# Patient Record
Sex: FEMALE | Race: WHITE | Employment: OTHER | URBAN - METROPOLITAN AREA
[De-identification: names, ages, dates, MRNs, and addresses within clinical notes are randomized per-mention and may not be internally consistent; named-entity substitution may affect disease eponyms.]

---

## 2018-05-07 ENCOUNTER — OFFICE VISIT (OUTPATIENT)
Dept: ORTHOPEDIC SURGERY | Age: 73
End: 2018-05-07

## 2018-05-07 VITALS
WEIGHT: 194 LBS | HEART RATE: 98 BPM | HEIGHT: 64 IN | SYSTOLIC BLOOD PRESSURE: 120 MMHG | BODY MASS INDEX: 33.12 KG/M2 | DIASTOLIC BLOOD PRESSURE: 83 MMHG

## 2018-05-07 DIAGNOSIS — M17.11 PRIMARY LOCALIZED OSTEOARTHROSIS OF RIGHT LOWER LEG: ICD-10-CM

## 2018-05-07 DIAGNOSIS — M25.561 ACUTE PAIN OF RIGHT KNEE: Primary | ICD-10-CM

## 2018-05-07 PROCEDURE — 99202 OFFICE O/P NEW SF 15 MIN: CPT | Performed by: ORTHOPAEDIC SURGERY

## 2018-05-07 PROCEDURE — 20610 DRAIN/INJ JOINT/BURSA W/O US: CPT | Performed by: ORTHOPAEDIC SURGERY

## 2018-05-07 RX ORDER — LIDOCAINE 50 MG/G
PATCH TOPICAL
COMMUNITY
Start: 2018-04-03

## 2018-05-07 RX ORDER — NIFEDIPINE 30 MG/1
TABLET, EXTENDED RELEASE ORAL
COMMUNITY
Start: 2018-04-30

## 2018-05-07 RX ORDER — RANITIDINE 150 MG/1
TABLET ORAL
COMMUNITY
Start: 2018-04-03

## 2018-05-07 RX ORDER — HYDROCHLOROTHIAZIDE 12.5 MG/1
CAPSULE, GELATIN COATED ORAL
COMMUNITY
Start: 2018-03-13

## 2018-05-15 ENCOUNTER — TELEPHONE (OUTPATIENT)
Dept: ORTHOPEDIC SURGERY | Age: 73
End: 2018-05-15

## 2018-05-25 ENCOUNTER — OFFICE VISIT (OUTPATIENT)
Dept: ORTHOPEDIC SURGERY | Age: 73
End: 2018-05-25

## 2018-05-25 VITALS
WEIGHT: 194 LBS | BODY MASS INDEX: 33.12 KG/M2 | SYSTOLIC BLOOD PRESSURE: 130 MMHG | HEART RATE: 80 BPM | DIASTOLIC BLOOD PRESSURE: 74 MMHG | HEIGHT: 64 IN

## 2018-05-25 DIAGNOSIS — M17.11 PRIMARY LOCALIZED OSTEOARTHROSIS OF RIGHT LOWER LEG: Primary | ICD-10-CM

## 2018-05-25 PROCEDURE — 20610 DRAIN/INJ JOINT/BURSA W/O US: CPT | Performed by: ORTHOPAEDIC SURGERY

## 2018-05-25 PROCEDURE — 99999 PR OFFICE/OUTPT VISIT,PROCEDURE ONLY: CPT | Performed by: ORTHOPAEDIC SURGERY

## 2018-06-01 ENCOUNTER — OFFICE VISIT (OUTPATIENT)
Dept: ORTHOPEDIC SURGERY | Age: 73
End: 2018-06-01

## 2018-06-01 VITALS
HEIGHT: 64 IN | HEART RATE: 78 BPM | SYSTOLIC BLOOD PRESSURE: 132 MMHG | DIASTOLIC BLOOD PRESSURE: 70 MMHG | BODY MASS INDEX: 33.12 KG/M2 | WEIGHT: 194 LBS

## 2018-06-01 DIAGNOSIS — M17.11 PRIMARY LOCALIZED OSTEOARTHROSIS OF RIGHT LOWER LEG: Primary | ICD-10-CM

## 2018-06-01 PROCEDURE — 20610 DRAIN/INJ JOINT/BURSA W/O US: CPT | Performed by: ORTHOPAEDIC SURGERY

## 2018-06-01 PROCEDURE — 99999 PR OFFICE/OUTPT VISIT,PROCEDURE ONLY: CPT | Performed by: ORTHOPAEDIC SURGERY

## 2018-06-08 ENCOUNTER — OFFICE VISIT (OUTPATIENT)
Dept: ORTHOPEDIC SURGERY | Age: 73
End: 2018-06-08

## 2018-06-08 VITALS
HEART RATE: 87 BPM | SYSTOLIC BLOOD PRESSURE: 132 MMHG | HEIGHT: 64 IN | WEIGHT: 194 LBS | DIASTOLIC BLOOD PRESSURE: 74 MMHG | BODY MASS INDEX: 33.12 KG/M2

## 2018-06-08 DIAGNOSIS — M17.11 PRIMARY LOCALIZED OSTEOARTHROSIS OF RIGHT LOWER LEG: Primary | ICD-10-CM

## 2018-06-08 PROCEDURE — 20610 DRAIN/INJ JOINT/BURSA W/O US: CPT | Performed by: ORTHOPAEDIC SURGERY

## 2018-06-08 PROCEDURE — 99999 PR OFFICE/OUTPT VISIT,PROCEDURE ONLY: CPT | Performed by: ORTHOPAEDIC SURGERY

## 2019-04-29 ENCOUNTER — OFFICE VISIT (OUTPATIENT)
Dept: ORTHOPEDIC SURGERY | Age: 74
End: 2019-04-29
Payer: COMMERCIAL

## 2019-04-29 VITALS — WEIGHT: 194 LBS | HEIGHT: 64 IN | BODY MASS INDEX: 33.12 KG/M2

## 2019-04-29 DIAGNOSIS — M17.11 PRIMARY LOCALIZED OSTEOARTHROSIS OF RIGHT LOWER LEG: Primary | ICD-10-CM

## 2019-04-29 PROCEDURE — 99213 OFFICE O/P EST LOW 20 MIN: CPT | Performed by: ORTHOPAEDIC SURGERY

## 2019-04-29 PROCEDURE — 20610 DRAIN/INJ JOINT/BURSA W/O US: CPT | Performed by: ORTHOPAEDIC SURGERY

## 2019-04-29 NOTE — PROGRESS NOTES
MD Mark Esquivel PA-C         Orthopaedic Surgery and Sports Medicine      Chief Complaint:  Knee Pain (RIGHT)      History of Present Illness:  Renae Leonard is a 74 y.o. female here regarding right knee pain. The pain is located medial.   Patient feels this discomfort may be related to a known injury No    The patient describes the symptoms as aching and sharp. Symptoms improve with rest, avoiding painful activities. The symptoms are worse with activity, weight bearing. Discomfort has been progressive over time. Sleeping habits related to chief complaint:fair    Outside reports reviewed: historical medical records. Medical History:  Patient's medications, allergies, past medical, surgical, social and family histories were reviewed and updated as appropriate. History reviewed. No pertinent past medical history. History reviewed. No pertinent surgical history. History reviewed. No pertinent family history.     Social History     Socioeconomic History    Marital status: Single     Spouse name: None    Number of children: None    Years of education: None    Highest education level: None   Occupational History    None   Social Needs    Financial resource strain: None    Food insecurity:     Worry: None     Inability: None    Transportation needs:     Medical: None     Non-medical: None   Tobacco Use    Smoking status: Never Smoker    Smokeless tobacco: Never Used   Substance and Sexual Activity    Alcohol use: None    Drug use: None    Sexual activity: None   Lifestyle    Physical activity:     Days per week: None     Minutes per session: None    Stress: None   Relationships    Social connections:     Talks on phone: None     Gets together: None     Attends Religion service: None     Active member of club or organization: None     Attends meetings of clubs or organizations: None     Relationship status: plantarflexion grossly intact. Neurologic & vascular: Sensation to both feet is grossly intact to light touch. The bilateral lower extremities are warm and well-perfused with brisk capillary refill. Additional Examinations:  Left Lower Extremity: Examination of the left lower extremity does not show any tenderness, deformity or injury. Range of motion is within normal limits. There is no gross instability. There are no rashes, ulcerations or lesions. Strength and tone are normal.      DIAGNOSTICS  Xrays obtained in office today: No  Xrays reviewed today: Yes  4 views of the right knee show   Fracture: no   Dislocation: no  Patellofemoral arthritis: moderate  Medial compartment: severe  Lateral compartment: mild  Varus deformity: No  Valgus deformity: No      ASSESSMENT (Medical Decision Making)    James Sidhu is a 74 y.o. female with the following diagnosis:  right moderate knee arthritis      ICD-10-CM    1. Primary localized osteoarthrosis of right lower leg M17.11 XR KNEE RIGHT (MIN 4 VIEWS)     79827 - UT DRAIN/INJECT LARGE JOINT/BURSA     UT METHYLPREDNISOLONE 40 MG INJ       Her overall course is unchanged despite conservative treatment      PLAN (Medical Decision Making)  Office Procedures:  Orders Placed This Encounter   Procedures    XR KNEE RIGHT (MIN 4 VIEWS)     Standing Status:   Future     Standing Expiration Date:   4/25/2020  20610 - UT DRAIN/INJECT LARGE JOINT/BURSA    UT METHYLPREDNISOLONE 40 MG INJ     The risks and benefits of an injection were discussed with the patient. The patient had full opportunity to ask questions and all were answered. The patient then provided verbal informed consent. The skin was then prepped with betadine solution and alcohol. Under aseptic conditions, the  right knee was injected with 2cc of 1% xylocaine, then a mixture of 3cc of 0.25% marcaine and 2cc (80 mg) of Depomedrol. There were no immediate complications following the injection.   The

## 2019-04-29 NOTE — LETTER
[place] [id number] [place] [address] [place] [facility] [address]  Phone: [phone]  Fax: [phone]    Valeria Frazier MD        April 29, 2019     Patient: Danny Deluca   YOB: 1945   Date of Visit: 4/29/2019       To Whom It May Concern: It is my medical opinion that Zahida Schaefer requires a disability parking placard for the following reasons:  OA RIGHT KNEE  Duration of need: 12 MONTHS    If you have any questions or concerns, please don't hesitate to call.     Sincerely,          MD Valeria Mercado MD

## 2021-08-20 ENCOUNTER — OFFICE VISIT (OUTPATIENT)
Dept: ORTHOPEDIC SURGERY | Age: 76
End: 2021-08-20
Payer: MEDICARE

## 2021-08-20 VITALS
DIASTOLIC BLOOD PRESSURE: 73 MMHG | SYSTOLIC BLOOD PRESSURE: 177 MMHG | BODY MASS INDEX: 34.55 KG/M2 | WEIGHT: 195 LBS | HEART RATE: 71 BPM | HEIGHT: 63 IN

## 2021-08-20 DIAGNOSIS — M17.11 PRIMARY OSTEOARTHRITIS OF RIGHT KNEE: Primary | ICD-10-CM

## 2021-08-20 PROCEDURE — G8417 CALC BMI ABV UP PARAM F/U: HCPCS | Performed by: ORTHOPAEDIC SURGERY

## 2021-08-20 PROCEDURE — 4040F PNEUMOC VAC/ADMIN/RCVD: CPT | Performed by: ORTHOPAEDIC SURGERY

## 2021-08-20 PROCEDURE — G8400 PT W/DXA NO RESULTS DOC: HCPCS | Performed by: ORTHOPAEDIC SURGERY

## 2021-08-20 PROCEDURE — 99203 OFFICE O/P NEW LOW 30 MIN: CPT | Performed by: ORTHOPAEDIC SURGERY

## 2021-08-20 PROCEDURE — 1036F TOBACCO NON-USER: CPT | Performed by: ORTHOPAEDIC SURGERY

## 2021-08-20 PROCEDURE — 20610 DRAIN/INJ JOINT/BURSA W/O US: CPT | Performed by: ORTHOPAEDIC SURGERY

## 2021-08-20 PROCEDURE — 1123F ACP DISCUSS/DSCN MKR DOCD: CPT | Performed by: ORTHOPAEDIC SURGERY

## 2021-08-20 PROCEDURE — G8427 DOCREV CUR MEDS BY ELIG CLIN: HCPCS | Performed by: ORTHOPAEDIC SURGERY

## 2021-08-20 PROCEDURE — 1090F PRES/ABSN URINE INCON ASSESS: CPT | Performed by: ORTHOPAEDIC SURGERY

## 2021-08-20 NOTE — PROGRESS NOTES
Transportation Needs:     Lack of Transportation (Medical):  Lack of Transportation (Non-Medical):    Physical Activity:     Days of Exercise per Week:     Minutes of Exercise per Session:    Stress:     Feeling of Stress :    Social Connections:     Frequency of Communication with Friends and Family:     Frequency of Social Gatherings with Friends and Family:     Attends [employer]ist Services:     Active Member of Clubs or Organizations:     Attends Club or Organization Meetings:     Marital Status:    Intimate Partner Violence:     Fear of Current or Ex-Partner:     Emotionally Abused:     Physically Abused:     Sexually Abused:        Family HISTORY    History reviewed. No pertinent family history. PHYSICAL EXAM    Vital Signs:  BP (!) 177/73   Pulse 71   Ht 5' 3\" (1.6 m)   Wt 195 lb (88.5 kg)   BMI 34.54 kg/m²   General Appearance:  Normal body habitus. Alert and oriented to person, place, and time. Affect:  Normal.   Gait: Antalgic using walker. Good balance and coordination. Skin:  Intact. Sensation:  Intact. Strength:  Intact. Reflexes:  Intact. Pulses:  Intact. Knee Exam:    Effusion: Negative    Range of Motion Right Left   Extension -8 0   Flexion 110 110     Provocative Test Right Left    Positive Negative Positive Negative   Anterior drawer [] [] [] []   Lachman [] [] [] []   Posterior drawer [] [] [] []   Varus testing [] [] [] []   Valgus testing [x] [] [] [x]   Joint line tenderness [x] [] [] [x]     Additional Exam Comments: Her neurocirculatory lymphatic exam is normal symmetric both lower extremities. She does have some fullness to the popliteal region consistent with a small Baker's cyst which is really not fluctuant more of a solid type synovial cyst solid mass. She has crepitus in the knee and some pain with range of motion.       IMAGING STUDIES    X-rays 2 views of her right knee demonstrate moderately severe grade 3+ osteoarthritis mostly medial compartment with calcification behind the knee consistent with her solid-appearing type mass. IMPRESSION    Right knee pain secondary to osteoarthritis    PLAN      1. Conservative care options including physical therapy, NSAIDs, bracing, and activity modification were discussed. 2.  The indications for therapeutic injections were discussed. 3.  The indications for additional imaging studies were discussed. 4.  After considering the various options discussed, the patient elected to pursue a course that includes a cortisone injection today and request viscosupplementation in the future. Recommendation is for a cortisone injection into the right knee. After informed consent was received from the patient, the right knee was injected with 1 mL(40mg)Depo-Medrol and 4 mL of 0.25% Marcaine  in the syringe from an anterolateral joint line approach, using a 25-gauge needle, under sterile Betadine prep, using ethyl chloride as a topical refrigerant, for a diagnosis of osteoarthritis. The patient appeared to tolerate it well. The patient should return here periodically as needed. Encounter Diagnosis   Name Primary?  Primary osteoarthritis of right knee Yes        No orders of the defined types were placed in this encounter.

## 2021-08-20 NOTE — LETTER
[address]  [address] [facility] [address]  Phone: [phone]  Fax: [phone]    Valdez Felix MD        2021    [address] [place] 1945  DOS 2021      KNEE VISIT      HISTORY OF PRESENT ILLNESS    Monisha Rutherford is a 64 y.o. female who presents for evaluation of right knee pain she has had for a number of years. She has done well with cortisone viscosupplementation in the past but states that she was given Durolane injection from an unusual injection site which caused her to have severe pain and swelling that is been unremitting since that injection last year. At this time she is here to see if we can help alleviate some pain and help her. I believe she had the Visco in February. ROS    Well-documented patient she form dated 2021  All other ROS negative except for above. Past Surgical history    History reviewed. No pertinent surgical history. PAST MEDICAL    History reviewed. No pertinent past medical history. Allergies    No Known Allergies    Meds    Current Outpatient Medications   Medication Sig Dispense Refill    lidocaine (LIDODERM) 5 %       NIFEdipine (PROCARDIA XL) 30 MG extended release tablet       ranitidine (ZANTAC) 150 MG tablet       hydrochlorothiazide (MICROZIDE) 12.5 MG capsule        No current facility-administered medications for this visit.        Social    Social History     Socioeconomic History    Marital status: Single     Spouse name: Not on file    Number of children: Not on file    Years of education: Not on file    Highest education level: Not on file   Occupational History    Not on file   Tobacco Use    Smoking status: Never Smoker    Smokeless tobacco: Never Used   Substance and Sexual Activity    Alcohol use: Not on file    Drug use: Not on file    Sexual activity: Not on file   Other Topics Concern    Not on file

## 2021-10-05 ENCOUNTER — OFFICE VISIT (OUTPATIENT)
Dept: ORTHOPEDIC SURGERY | Age: 76
End: 2021-10-05
Payer: MEDICARE

## 2021-10-05 VITALS — HEIGHT: 63 IN | BODY MASS INDEX: 34.55 KG/M2 | WEIGHT: 195 LBS

## 2021-10-05 DIAGNOSIS — M17.11 PRIMARY OSTEOARTHRITIS OF RIGHT KNEE: Primary | ICD-10-CM

## 2021-10-05 PROCEDURE — 20610 DRAIN/INJ JOINT/BURSA W/O US: CPT | Performed by: ORTHOPAEDIC SURGERY

## 2021-10-05 RX ORDER — METHYLPREDNISOLONE ACETATE 40 MG/ML
40 INJECTION, SUSPENSION INTRA-ARTICULAR; INTRALESIONAL; INTRAMUSCULAR; SOFT TISSUE ONCE
Status: COMPLETED | OUTPATIENT
Start: 2021-10-05 | End: 2021-10-05

## 2021-10-05 RX ORDER — BUPIVACAINE HYDROCHLORIDE 2.5 MG/ML
7 INJECTION, SOLUTION INFILTRATION; PERINEURAL ONCE
Status: COMPLETED | OUTPATIENT
Start: 2021-10-05 | End: 2021-10-05

## 2021-10-05 RX ADMIN — METHYLPREDNISOLONE ACETATE 40 MG: 40 INJECTION, SUSPENSION INTRA-ARTICULAR; INTRALESIONAL; INTRAMUSCULAR; SOFT TISSUE at 15:27

## 2021-10-05 RX ADMIN — BUPIVACAINE HYDROCHLORIDE 17.5 MG: 2.5 INJECTION, SOLUTION INFILTRATION; PERINEURAL at 15:27

## 2021-10-05 NOTE — PROGRESS NOTES
Recommendation is for viscosupplementation using Durolane. The Right knee was injected with 3 ml of Durolane from an anterolateral joint line approach using a 22-gauge needle under sterile Betadine prep, using ethyl chloride as a topical refrigerant, for a diagnosis of osteoarthritis. The patient appeared to tolerate it well. The patient should return here in 2 months. Recommendation is for a cortisone injection into the right knee. After informed consent was received from the patient, the right knee was injected with 1 mL(40mg)Depo-Medrol and 4 mL of 0.25% Marcaine  in the syringe from an anterolateral joint line approach, using a 25-gauge needle, under sterile Betadine prep, using ethyl chloride as a topical refrigerant, for a diagnosis of osteoarthritis. The patient appeared to tolerate it well. The patient should return here periodically as needed. Encounter Diagnosis   Name Primary?     Primary osteoarthritis of right knee Yes        Orders Placed This Encounter   Procedures    IL ARTHROCENTESIS ASPIR&/INJ MAJOR JT/BURSA W/O US

## 2021-10-15 ENCOUNTER — CLINICAL DOCUMENTATION (OUTPATIENT)
Dept: OTHER | Age: 76
End: 2021-10-15

## 2022-01-20 ENCOUNTER — TELEPHONE (OUTPATIENT)
Dept: ORTHOPEDIC SURGERY | Age: 77
End: 2022-01-20

## 2022-01-20 NOTE — TELEPHONE ENCOUNTER
Spoke with patient in regards to the [address] [place] [address] knee joint pain program. Patient was established with Dr. Manjula Marrero but stated that the injections she received in the past didn't help her. She doesn't want to come back at this time. She can give us a call at [phone] if there is anything further that we can do for her in the future.

## 2024-12-23 ENCOUNTER — TELEPHONE (OUTPATIENT)
Dept: INTERVENTIONAL RADIOLOGY/VASCULAR | Age: 79
End: 2024-12-23

## 2024-12-23 NOTE — TELEPHONE ENCOUNTER
Call placed to [facility], but unable to connect with imaging department. Images needed to approve Abd Lymph node bx. Zachariah [facility] made aware.

## 2024-12-24 ENCOUNTER — HOSPITAL ENCOUNTER (OUTPATIENT)
Dept: PET IMAGING | Age: 79
Discharge: HOME | End: 2024-12-24
Payer: MEDICARE

## 2024-12-24 DIAGNOSIS — C78.6 SECONDARY MALIGNANT NEOPLASM OF RETROPERITONEUM AND PERITONEUM (HCC): ICD-10-CM

## 2024-12-24 PROCEDURE — A9609 HC RX DIAGNOSTIC RADIOPHARMACEUTICAL: HCPCS | Performed by: INTERNAL MEDICINE

## 2024-12-24 PROCEDURE — 78815 PET IMAGE W/CT SKULL-THIGH: CPT

## 2024-12-24 PROCEDURE — 3430000000 HC RX DIAGNOSTIC RADIOPHARMACEUTICAL: Performed by: INTERNAL MEDICINE

## 2024-12-24 RX ORDER — FLUDEOXYGLUCOSE F 18 200 MCI/ML
14.27 INJECTION, SOLUTION INTRAVENOUS
Status: COMPLETED | OUTPATIENT
Start: 2024-12-24 | End: 2024-12-24

## 2024-12-24 RX ADMIN — FLUDEOXYGLUCOSE F 18 14.27 MILLICURIE: 200 INJECTION, SOLUTION INTRAVENOUS at 13:52

## 2024-12-26 ENCOUNTER — TELEPHONE (OUTPATIENT)
Dept: INTERVENTIONAL RADIOLOGY/VASCULAR | Age: 79
End: 2024-12-26

## 2024-12-26 NOTE — TELEPHONE ENCOUNTER
Call placed to [facility] to have CT images pushed. Spoke with jeremy in Radiology who stated she would try to push images again.

## 2024-12-27 ENCOUNTER — TELEPHONE (OUTPATIENT)
Dept: INTERVENTIONAL RADIOLOGY/VASCULAR | Age: 79
End: 2024-12-27

## 2024-12-27 NOTE — TELEPHONE ENCOUNTER
Called and spoke to Patient about upcoming procedure. Phone number used: [phone]  Procedure: Lymph node biopsy  Approving Radiologist: Clau     Pt informed of the following:  Date of procedure: 01/06/2025   Arrival time of procedure: 0830  Time of procedure: 0930  Check in at Main Entrance prior to procedure.    Pre Procedure Checklist:   H & P completed within 30 days of scheduled procedure?Yes  If No  Call placed to PCP no    Allergies:  Reviewed?Yes  Have you ever had a reaction to Contrast/ IV Dye? No  If Yes, What was reaction?   Lidocaine Allergy?  No  If Yes, What was reaction?     Medications:  On any blood thinners? No.   If yes: N/A  Instructed to hold: N/A     Blood pressure medication?  Yes. If yes, take the morning of procedure.     Sedation:  Sedation Type:IV Sedation    Any issues with sedation in the past? No  If Yes, What was reaction? na  Patient will need a  the day of procedure.     Nothing to eat or drink after midnight.        Need SDS: No    Orders:  Pre-procedure orders placed.Yes   Lab ordered: CBC, PT/INR    Post-procedure recovery will be here 2 hours.

## 2025-01-06 ENCOUNTER — TELEPHONE (OUTPATIENT)
Dept: INTERVENTIONAL RADIOLOGY/VASCULAR | Age: 80
End: 2025-01-06

## 2025-01-06 NOTE — TELEPHONE ENCOUNTER
Pt called to reschedule to 1/13/25 due to weather. Pt will arrive at 0830, and be ready/recovered by IR team.

## 2025-01-13 ENCOUNTER — HOSPITAL ENCOUNTER (OUTPATIENT)
Dept: CT IMAGING | Age: 80
Discharge: HOME | End: 2025-01-13
Payer: MEDICARE

## 2025-01-13 VITALS
HEIGHT: 62 IN | WEIGHT: 162 LBS | RESPIRATION RATE: 16 BRPM | BODY MASS INDEX: 29.81 KG/M2 | DIASTOLIC BLOOD PRESSURE: 67 MMHG | TEMPERATURE: 98.2 F | HEART RATE: 81 BPM | OXYGEN SATURATION: 98 % | SYSTOLIC BLOOD PRESSURE: 116 MMHG

## 2025-01-13 DIAGNOSIS — C78.6 PERITONEAL CARCINOMATOSIS (HCC): ICD-10-CM

## 2025-01-13 LAB
DEPRECATED RDW RBC AUTO: 14.8 % (ref 12.4–15.4)
HCT VFR BLD AUTO: 38.4 % (ref 36–48)
HGB BLD-MCNC: 12.7 G/DL (ref 12–16)
INR PPP: 1.05 (ref 0.85–1.15)
MCH RBC QN AUTO: 29.5 PG (ref 26–34)
MCHC RBC AUTO-ENTMCNC: 33.2 G/DL (ref 31–36)
MCV RBC AUTO: 88.9 FL (ref 80–100)
PLATELET # BLD AUTO: 328 K/UL (ref 135–450)
PMV BLD AUTO: 7.4 FL (ref 5–10.5)
PROTHROMBIN TIME: 14 SEC (ref 11.9–14.9)
RBC # BLD AUTO: 4.32 M/UL (ref 4–5.2)
WBC # BLD AUTO: 13 K/UL (ref 4–11)

## 2025-01-13 PROCEDURE — 38505 NEEDLE BIOPSY LYMPH NODES: CPT | Performed by: INTERNAL MEDICINE

## 2025-01-13 PROCEDURE — 77012 CT SCAN FOR NEEDLE BIOPSY: CPT

## 2025-01-13 PROCEDURE — 88333 PATH CONSLTJ SURG CYTO XM 1: CPT

## 2025-01-13 PROCEDURE — 88305 TISSUE EXAM BY PATHOLOGIST: CPT

## 2025-01-13 PROCEDURE — 88342 IMHCHEM/IMCYTCHM 1ST ANTB: CPT

## 2025-01-13 PROCEDURE — 36415 COLL VENOUS BLD VENIPUNCTURE: CPT

## 2025-01-13 PROCEDURE — 88360 TUMOR IMMUNOHISTOCHEM/MANUAL: CPT

## 2025-01-13 PROCEDURE — 88341 IMHCHEM/IMCYTCHM EA ADD ANTB: CPT

## 2025-01-13 PROCEDURE — 85610 PROTHROMBIN TIME: CPT

## 2025-01-13 PROCEDURE — 88313 SPECIAL STAINS GROUP 2: CPT

## 2025-01-13 PROCEDURE — 6360000002 HC RX W HCPCS: Performed by: RADIOLOGY

## 2025-01-13 PROCEDURE — 99152 MOD SED SAME PHYS/QHP 5/>YRS: CPT

## 2025-01-13 PROCEDURE — 85027 COMPLETE CBC AUTOMATED: CPT

## 2025-01-13 RX ORDER — MIDAZOLAM HYDROCHLORIDE 5 MG/ML
INJECTION, SOLUTION INTRAMUSCULAR; INTRAVENOUS PRN
Status: COMPLETED | OUTPATIENT
Start: 2025-01-13 | End: 2025-01-13

## 2025-01-13 RX ORDER — SODIUM CHLORIDE 0.9 % (FLUSH) 0.9 %
5-40 SYRINGE (ML) INJECTION PRN
Status: DISCONTINUED | OUTPATIENT
Start: 2025-01-13 | End: 2025-01-14 | Stop reason: HOSPADM

## 2025-01-13 RX ORDER — FENTANYL CITRATE 50 UG/ML
INJECTION, SOLUTION INTRAMUSCULAR; INTRAVENOUS PRN
Status: COMPLETED | OUTPATIENT
Start: 2025-01-13 | End: 2025-01-13

## 2025-01-13 RX ORDER — SODIUM CHLORIDE 9 MG/ML
INJECTION, SOLUTION INTRAVENOUS PRN
Status: DISCONTINUED | OUTPATIENT
Start: 2025-01-13 | End: 2025-01-14 | Stop reason: HOSPADM

## 2025-01-13 RX ORDER — SODIUM CHLORIDE 0.9 % (FLUSH) 0.9 %
5-40 SYRINGE (ML) INJECTION EVERY 12 HOURS SCHEDULED
Status: DISCONTINUED | OUTPATIENT
Start: 2025-01-13 | End: 2025-01-14 | Stop reason: HOSPADM

## 2025-01-13 RX ADMIN — FENTANYL CITRATE 50 MCG: 50 INJECTION INTRAMUSCULAR; INTRAVENOUS at 11:39

## 2025-01-13 RX ADMIN — MIDAZOLAM HYDROCHLORIDE 0.5 MG: 5 INJECTION, SOLUTION INTRAMUSCULAR; INTRAVENOUS at 11:48

## 2025-01-13 RX ADMIN — MIDAZOLAM HYDROCHLORIDE 1 MG: 5 INJECTION, SOLUTION INTRAMUSCULAR; INTRAVENOUS at 11:40

## 2025-01-13 RX ADMIN — FENTANYL CITRATE 25 MCG: 50 INJECTION INTRAMUSCULAR; INTRAVENOUS at 11:47

## 2025-01-13 ASSESSMENT — PAIN - FUNCTIONAL ASSESSMENT
PAIN_FUNCTIONAL_ASSESSMENT: NONE - DENIES PAIN
PAIN_FUNCTIONAL_ASSESSMENT: NONE - DENIES PAIN

## 2025-01-13 NOTE — PROGRESS NOTES
Pt to recover x 1 hour per Dr. Moulton. Vitals stable, on room air. Pt's  - Edil at bedside and updated.

## 2025-01-13 NOTE — PRE SEDATION
Sedation Pre-Procedure Note    Patient Name: Anita Zamora   YOB: 1945  Room/Bed: Room/bed info not found  Medical Record Number: [id number]  Date: 1/13/2025   Time: 11:31 AM       Indication:  Abdominal Mass Biopsy    Consent: I have discussed with the patient and/or the patient representative the indication, alternatives, and the possible risks and/or complications of the planned procedure and the anesthesia methods. The patient and/or patient representative appear to understand and agree to proceed.    Vital Signs:   Vitals:    01/13/25 0958   BP: (!) 121/55   Pulse: 81   Resp: 16   Temp: 97.5 °F (36.4 °C)   SpO2: 96%       Past Medical History:   has a past medical history of Acid reflux, Back pain, and Hypertension.    Past Surgical History:   has no past surgical history on file.    Medications:   Scheduled Meds:    sodium chloride flush  5-40 mL IntraVENous 2 times per day     Continuous Infusions:    sodium chloride       PRN Meds: sodium chloride flush, sodium chloride  Home Meds:   Prior to Admission medications    Medication Sig Start Date End Date Taking? Authorizing Provider   NIFEdipine (PROCARDIA XL) 30 MG extended release tablet  4/30/18  Yes ProviderLuca MD   lidocaine (LIDODERM) 5 %  4/3/18   Provider, MD Luca     Coumadin Use Last 7 Days:  no  Antiplatelet drug therapy use last 7 days: no  Other anticoagulant use last 7 days: no  Additional Medication Information:        Pre-Sedation Documentation and Exam:   I have reviewed the patient's history and review of systems.    Mallampati Airway Assessment:  Mallampati Class II - (soft palate, fauces & uvula are visible)    Prior History of Anesthesia Complications:   none    ASA Classification:  Class 2 - A normal healthy patient with mild systemic disease    Sedation/ Anesthesia Plan:   intravenous sedation    Medications Planned:   midazolam (Versed) intravenously and fentanyl intravenously    Patient is an

## 2025-01-13 NOTE — OR NURSING
Image guided lymph node biopsy biopsy completed by Dr. Moulton. Pt tolerated procedure without any signs or symptoms of distress. Vital signs stable. Report given  to IR TEAM RN. Pt transported back to [facility] in stable condition via stretcher.     Total Biopsy: 3  Received: Versed: 1.5 mg       Fentanyl: 75 mcg  Bandage to MID ABD that is clean dry and intact.     This RN wasted the following with SANDRA MARIANO.  0.5 mg Versed  25 mcg Fentanyl     Vital Signs  Vitals:    01/13/25 1156   BP: (!) 112/56   Pulse: 71   Resp: 15   Temp:    SpO2: 99%    (vital signs in table format)    Post Aleksandra  2 - Able to move 4 extremities voluntarily on command  2 - BP+/- 20mmHg of normal  2 - Fully awake  2 - Able to maintain oxygen saturation >92% on room air  2 - Able to breathe deeply and cough freely

## 2025-01-13 NOTE — OR NURSING
Pt arrived for image guided lymph node biopsy mid abd. Procedure explained including the risk and benefits of the procedure. All questions answered. Pt verbalizes understanding of the of procedure and states no more questions. Consent signed. Vital signs stable, labs, allergies, medications, and code status reviewed. No contraindications noted.     Vitals:    01/13/25 1136   BP: 131/67   Pulse: 76   Resp: 17   Temp:    SpO2: 96%    (vital signs in table format)    Aleksandra Score  2 - Able to move 4 extremities voluntarily on command  2 - BP+/- 20mmHg of normal  2 - Fully awake  2 - Able to maintain oxygen saturation >92% on room air  2 - Able to breathe deeply and cough freely    Allergies  Patient has no known allergies.    Labs  Lab Results   Component Value Date    INR 1.05 01/13/2025    PROTIME 14.0 01/13/2025       No results found for: \"CREATININE\", \"BUN\", \"GFR\", \"NA\", \"K\", \"CL\", \"CO2\"    Lab Results   Component Value Date    WBC 13.0 (H) 01/13/2025    HGB 12.7 01/13/2025    HCT 38.4 01/13/2025    MCV 88.9 01/13/2025     01/13/2025

## 2025-02-24 ENCOUNTER — APPOINTMENT (OUTPATIENT)
Dept: GENERAL RADIOLOGY | Age: 80
End: 2025-02-24
Payer: MEDICARE

## 2025-02-24 ENCOUNTER — HOSPITAL ENCOUNTER (INPATIENT)
Age: 80
LOS: 4 days | Discharge: NURSING, REHAB OR LONG-TERM CARE | End: 2025-02-28
Attending: EMERGENCY MEDICINE | Admitting: INTERNAL MEDICINE
Payer: MEDICARE

## 2025-02-24 DIAGNOSIS — E86.0 DEHYDRATION: ICD-10-CM

## 2025-02-24 DIAGNOSIS — I83.892 VARICOSE VEINS OF LEFT LOWER EXTREMITY WITH EDEMA: ICD-10-CM

## 2025-02-24 DIAGNOSIS — R53.1 GENERAL WEAKNESS: ICD-10-CM

## 2025-02-24 DIAGNOSIS — R60.0 LEG EDEMA, LEFT: ICD-10-CM

## 2025-02-24 DIAGNOSIS — C48.2 MALIGNANT NEOPLASM OF PERITONEUM, UNSPECIFIED (HCC): ICD-10-CM

## 2025-02-24 DIAGNOSIS — R13.19 ESOPHAGEAL DYSPHAGIA: ICD-10-CM

## 2025-02-24 DIAGNOSIS — R52 PAIN: ICD-10-CM

## 2025-02-24 DIAGNOSIS — D72.819 LEUKOPENIA, UNSPECIFIED TYPE: Primary | ICD-10-CM

## 2025-02-24 DIAGNOSIS — T45.1X5A ADVERSE EFFECT OF CHEMOTHERAPY, INITIAL ENCOUNTER: ICD-10-CM

## 2025-02-24 DIAGNOSIS — R19.7 DIARRHEA, UNSPECIFIED TYPE: ICD-10-CM

## 2025-02-24 LAB
ALBUMIN SERPL-MCNC: 2.8 G/DL (ref 3.4–5)
ALBUMIN/GLOB SERPL: 1 {RATIO} (ref 1.1–2.2)
ALP SERPL-CCNC: 73 U/L (ref 40–129)
ALT SERPL-CCNC: 16 U/L (ref 10–40)
ANION GAP SERPL CALCULATED.3IONS-SCNC: 17 MMOL/L (ref 3–16)
ANISOCYTOSIS BLD QL SMEAR: ABNORMAL
AST SERPL-CCNC: 26 U/L (ref 15–37)
BASOPHILS # BLD: 0 K/UL (ref 0–0.2)
BASOPHILS NFR BLD: 0 %
BILIRUB SERPL-MCNC: 0.5 MG/DL (ref 0–1)
BUN SERPL-MCNC: 46 MG/DL (ref 7–20)
BURR CELLS BLD QL SMEAR: ABNORMAL
C DIFF TOX A+B STL QL IA: NORMAL
CALCIUM SERPL-MCNC: 7.6 MG/DL (ref 8.3–10.6)
CHLORIDE SERPL-SCNC: 102 MMOL/L (ref 99–110)
CO2 SERPL-SCNC: 19 MMOL/L (ref 21–32)
CREAT SERPL-MCNC: 1.2 MG/DL (ref 0.6–1.2)
DACRYOCYTES BLD QL SMEAR: ABNORMAL
DEPRECATED RDW RBC AUTO: 15.2 % (ref 12.4–15.4)
EKG ATRIAL RATE: 102 BPM
EKG DIAGNOSIS: NORMAL
EKG P AXIS: 28 DEGREES
EKG P-R INTERVAL: 156 MS
EKG Q-T INTERVAL: 366 MS
EKG QRS DURATION: 130 MS
EKG QTC CALCULATION (BAZETT): 477 MS
EKG R AXIS: 0 DEGREES
EKG T AXIS: 175 DEGREES
EKG VENTRICULAR RATE: 102 BPM
EOSINOPHIL # BLD: 0 K/UL (ref 0–0.6)
EOSINOPHIL NFR BLD: 0 %
FLUAV RNA RESP QL NAA+PROBE: NOT DETECTED
FLUBV RNA RESP QL NAA+PROBE: NOT DETECTED
GFR SERPLBLD CREATININE-BSD FMLA CKD-EPI: 45 ML/MIN/{1.73_M2}
GLUCOSE SERPL-MCNC: 195 MG/DL (ref 70–99)
HCT VFR BLD AUTO: 30.1 % (ref 36–48)
HGB BLD-MCNC: 10.2 G/DL (ref 12–16)
HYPOCHROMIA BLD QL SMEAR: ABNORMAL
LACTATE BLDV-SCNC: 4.2 MMOL/L (ref 0.4–2)
LACTATE BLDV-SCNC: 4.6 MMOL/L (ref 0.4–2)
LYMPHOCYTES # BLD: 0.8 K/UL (ref 1–5.1)
LYMPHOCYTES NFR BLD: 40 %
MCH RBC QN AUTO: 30 PG (ref 26–34)
MCHC RBC AUTO-ENTMCNC: 34 G/DL (ref 31–36)
MCV RBC AUTO: 88.1 FL (ref 80–100)
MONOCYTES # BLD: 0.1 K/UL (ref 0–1.3)
MONOCYTES NFR BLD: 6 %
NEUTROPHILS # BLD: 1 K/UL (ref 1.7–7.7)
NEUTROPHILS NFR BLD: 51 %
NEUTS BAND NFR BLD MANUAL: 3 % (ref 0–7)
OVALOCYTES BLD QL SMEAR: ABNORMAL
PATH INTERP BLD-IMP: YES
PLATELET # BLD AUTO: 220 K/UL (ref 135–450)
PLATELET BLD QL SMEAR: ADEQUATE
PMV BLD AUTO: 8.5 FL (ref 5–10.5)
POIKILOCYTOSIS BLD QL SMEAR: ABNORMAL
POTASSIUM SERPL-SCNC: 3.6 MMOL/L (ref 3.5–5.1)
PROT SERPL-MCNC: 5.6 G/DL (ref 6.4–8.2)
RBC # BLD AUTO: 3.41 M/UL (ref 4–5.2)
SARS-COV-2 RNA RESP QL NAA+PROBE: NOT DETECTED
SLIDE REVIEW: ABNORMAL
SMUDGE CELLS BLD QL SMEAR: PRESENT
SODIUM SERPL-SCNC: 138 MMOL/L (ref 136–145)
WBC # BLD AUTO: 1.9 K/UL (ref 4–11)

## 2025-02-24 PROCEDURE — 36415 COLL VENOUS BLD VENIPUNCTURE: CPT

## 2025-02-24 PROCEDURE — 87040 BLOOD CULTURE FOR BACTERIA: CPT

## 2025-02-24 PROCEDURE — 96360 HYDRATION IV INFUSION INIT: CPT

## 2025-02-24 PROCEDURE — 80053 COMPREHEN METABOLIC PANEL: CPT

## 2025-02-24 PROCEDURE — 96365 THER/PROPH/DIAG IV INF INIT: CPT

## 2025-02-24 PROCEDURE — 87324 CLOSTRIDIUM AG IA: CPT

## 2025-02-24 PROCEDURE — 2580000003 HC RX 258: Performed by: EMERGENCY MEDICINE

## 2025-02-24 PROCEDURE — 87449 NOS EACH ORGANISM AG IA: CPT

## 2025-02-24 PROCEDURE — 85025 COMPLETE CBC W/AUTO DIFF WBC: CPT

## 2025-02-24 PROCEDURE — 2580000003 HC RX 258

## 2025-02-24 PROCEDURE — 93010 ELECTROCARDIOGRAM REPORT: CPT | Performed by: INTERNAL MEDICINE

## 2025-02-24 PROCEDURE — 6360000002 HC RX W HCPCS: Performed by: EMERGENCY MEDICINE

## 2025-02-24 PROCEDURE — 83605 ASSAY OF LACTIC ACID: CPT

## 2025-02-24 PROCEDURE — 99285 EMERGENCY DEPT VISIT HI MDM: CPT

## 2025-02-24 PROCEDURE — 71045 X-RAY EXAM CHEST 1 VIEW: CPT

## 2025-02-24 PROCEDURE — 2060000000 HC ICU INTERMEDIATE R&B

## 2025-02-24 PROCEDURE — 87636 SARSCOV2 & INF A&B AMP PRB: CPT

## 2025-02-24 PROCEDURE — 93005 ELECTROCARDIOGRAM TRACING: CPT | Performed by: EMERGENCY MEDICINE

## 2025-02-24 RX ORDER — SODIUM CHLORIDE, SODIUM LACTATE, POTASSIUM CHLORIDE, CALCIUM CHLORIDE 600; 310; 30; 20 MG/100ML; MG/100ML; MG/100ML; MG/100ML
INJECTION, SOLUTION INTRAVENOUS CONTINUOUS
Status: DISCONTINUED | OUTPATIENT
Start: 2025-02-24 | End: 2025-02-25

## 2025-02-24 RX ORDER — MAGNESIUM SULFATE IN WATER 40 MG/ML
2000 INJECTION, SOLUTION INTRAVENOUS PRN
Status: DISCONTINUED | OUTPATIENT
Start: 2025-02-24 | End: 2025-02-28 | Stop reason: HOSPADM

## 2025-02-24 RX ORDER — POTASSIUM CHLORIDE 1500 MG/1
40 TABLET, EXTENDED RELEASE ORAL PRN
Status: DISCONTINUED | OUTPATIENT
Start: 2025-02-24 | End: 2025-02-28 | Stop reason: HOSPADM

## 2025-02-24 RX ORDER — ACETAMINOPHEN 650 MG/1
650 SUPPOSITORY RECTAL EVERY 6 HOURS PRN
Status: DISCONTINUED | OUTPATIENT
Start: 2025-02-24 | End: 2025-02-25

## 2025-02-24 RX ORDER — ENOXAPARIN SODIUM 100 MG/ML
40 INJECTION SUBCUTANEOUS DAILY
Status: DISCONTINUED | OUTPATIENT
Start: 2025-02-25 | End: 2025-02-25

## 2025-02-24 RX ORDER — SODIUM CHLORIDE 0.9 % (FLUSH) 0.9 %
5-40 SYRINGE (ML) INJECTION EVERY 12 HOURS SCHEDULED
Status: DISCONTINUED | OUTPATIENT
Start: 2025-02-24 | End: 2025-02-28 | Stop reason: HOSPADM

## 2025-02-24 RX ORDER — ONDANSETRON 2 MG/ML
4 INJECTION INTRAMUSCULAR; INTRAVENOUS EVERY 6 HOURS PRN
Status: DISCONTINUED | OUTPATIENT
Start: 2025-02-24 | End: 2025-02-28 | Stop reason: HOSPADM

## 2025-02-24 RX ORDER — FAMOTIDINE 10 MG/ML
20 INJECTION, SOLUTION INTRAVENOUS
Status: ON HOLD | COMMUNITY
Start: 2025-02-21 | End: 2025-02-28 | Stop reason: HOSPADM

## 2025-02-24 RX ORDER — POLYETHYLENE GLYCOL 3350 17 G/17G
17 POWDER, FOR SOLUTION ORAL DAILY PRN
Status: DISCONTINUED | OUTPATIENT
Start: 2025-02-24 | End: 2025-02-28 | Stop reason: HOSPADM

## 2025-02-24 RX ORDER — SODIUM CHLORIDE 9 MG/ML
INJECTION, SOLUTION INTRAVENOUS CONTINUOUS
Status: DISCONTINUED | OUTPATIENT
Start: 2025-02-24 | End: 2025-02-25

## 2025-02-24 RX ORDER — ACETAMINOPHEN 325 MG/1
650 TABLET ORAL EVERY 6 HOURS PRN
Status: DISCONTINUED | OUTPATIENT
Start: 2025-02-24 | End: 2025-02-25

## 2025-02-24 RX ORDER — ONDANSETRON 4 MG/1
4 TABLET, ORALLY DISINTEGRATING ORAL EVERY 8 HOURS PRN
Status: DISCONTINUED | OUTPATIENT
Start: 2025-02-24 | End: 2025-02-28 | Stop reason: HOSPADM

## 2025-02-24 RX ORDER — 0.9 % SODIUM CHLORIDE 0.9 %
1000 INTRAVENOUS SOLUTION INTRAVENOUS ONCE
Status: COMPLETED | OUTPATIENT
Start: 2025-02-24 | End: 2025-02-24

## 2025-02-24 RX ORDER — SODIUM CHLORIDE 9 MG/ML
INJECTION, SOLUTION INTRAVENOUS PRN
Status: DISCONTINUED | OUTPATIENT
Start: 2025-02-24 | End: 2025-02-28 | Stop reason: HOSPADM

## 2025-02-24 RX ORDER — POTASSIUM CHLORIDE 7.45 MG/ML
10 INJECTION INTRAVENOUS PRN
Status: DISCONTINUED | OUTPATIENT
Start: 2025-02-24 | End: 2025-02-28 | Stop reason: HOSPADM

## 2025-02-24 RX ORDER — 0.9 % SODIUM CHLORIDE 0.9 %
30 INTRAVENOUS SOLUTION INTRAVENOUS ONCE
Status: COMPLETED | OUTPATIENT
Start: 2025-02-24 | End: 2025-02-24

## 2025-02-24 RX ORDER — SODIUM CHLORIDE 0.9 % (FLUSH) 0.9 %
5-40 SYRINGE (ML) INJECTION PRN
Status: DISCONTINUED | OUTPATIENT
Start: 2025-02-24 | End: 2025-02-28 | Stop reason: HOSPADM

## 2025-02-24 RX ADMIN — SODIUM CHLORIDE 2000 ML: 9 INJECTION, SOLUTION INTRAVENOUS at 12:39

## 2025-02-24 RX ADMIN — SODIUM CHLORIDE 1000 ML: 0.9 INJECTION, SOLUTION INTRAVENOUS at 16:16

## 2025-02-24 RX ADMIN — SODIUM CHLORIDE 1000 MG: 900 INJECTION INTRAVENOUS at 15:29

## 2025-02-24 RX ADMIN — SODIUM CHLORIDE: 0.9 INJECTION, SOLUTION INTRAVENOUS at 17:47

## 2025-02-24 ASSESSMENT — ENCOUNTER SYMPTOMS: DIARRHEA: 1

## 2025-02-24 NOTE — ED NOTES
I placed the patient on a bed pan because she felt like she needed to have a BM. The patient was unable to go at this time. The patient was changed and placed on a purewick. The patient is aware that we need stool and urine from her.    VM received; pt mother, Reyna Gardner requesting assistance w/scheduling apt (dc 05/24)    Dr Celeste Nuñez, ORTHOPEDIC  [facility] [phone]  Pt mother advised that she has already made her apt  Closing encounter

## 2025-02-24 NOTE — PLAN OF CARE
PCU admit     Peritoneal carcinoma   Last chemo 3 days ago- receives q3 weeks; has had 2 rounds  Neutropenic isolation   Tachycardic with soft BP  Elevated lactic- fluid bolus in ED; IVF continued  Extreme fatigue past 2 days  Chronic diarrhea- had OP GI apt; daughter wants done here while admitted. States patient has also had some dysphagia.     Admitted for weakness and inability to carry out ADLs  Eboni Pillai PA-C 2/24/2025 4:10 PM

## 2025-02-24 NOTE — ED NOTES
Daughter at the bedside would like to see the provider before she leaves for the night. I perfect served the inpatient provider.

## 2025-02-24 NOTE — ED PROVIDER NOTES
46 (*)     Est, Glom Filt Rate 45 (*)     Calcium 7.6 (*)     Total Protein 5.6 (*)     Albumin 2.8 (*)     Albumin/Globulin Ratio 1.0 (*)     All other components within normal limits   LACTIC ACID - Abnormal; Notable for the following components:    Lactic Acid 4.2 (*)     All other components within normal limits    Narrative:     CALL  [place]  [facility] tel. [phone],  Chemistry results called to and read back by jacob carmona RN, 02/24/2025  13:04, by KAYLAH   COVID-19 & INFLUENZA COMBO   C DIFF TOXIN/ANTIGEN   CULTURE, BLOOD 1   CULTURE, BLOOD 2   URINALYSIS WITH REFLEX TO CULTURE   LACTIC ACID       All otherlabs were within normal range or not returned as of this dictation.      EMERGENCY DEPARTMENT COURSE and DIFFERENTIAL DIAGNOSIS/MDM:   Vitals:    Vitals:    02/24/25 1142 02/24/25 1200 02/24/25 1310 02/24/25 1510   BP:  (!) 73/50 92/70 99/66   Pulse:   99 (!) 101   Resp:   28 28   Temp: (!) 96.5 °F (35.8 °C)      TempSrc: Temporal      SpO2:  (!) 89% 96% 95%   Weight:       Height:           Patient was given the following medications:  Medications   cefTRIAXone (ROCEPHIN) 1,000 mg in sodium chloride 0.9 % 50 mL IVPB (mini-bag) (1,000 mg IntraVENous New Bag 2/24/25 1529)   sodium chloride 0.9 % bolus 2,082 mL (0 mLs IntraVENous Stopped 2/24/25 1339)       CONSULTS: (Who and What was discussed)  IP CONSULT TO HOSPITALIST    CC/HPI Summary, DDx, ED Course, Reassessment, Disposition Considerations (include Tests not done, Admit vs D/C, Shared Decision Making, Pt Expectation of Test or Tx.):  Patient appears chronically ill on arrival with borderline hypotensive blood pressure and mild tachycardia.  Patient is given IV fluids with improvement in vital signs including resolution of hypotension.  Laboratory valuation shows leukopenia as well as elevated lactic acid.  Patient family deny any specific localizing infectious symptoms other than diarrhea denying any cough, runny nose, abdominal pain.  Rapid flu Tere and  COVID test is negative.  Chest x-rays performed the emergency department and read by radiology as was independently reviewed by myself and does show low volumes but no obvious evidence of pneumonia.  Urinalysis and stool studies ordered and patient given IV fluids however patient is unable to provide a urine sample and feel invasive catheterization and thus immunocompromise patient has a greater risk than benefit at this time.  Will obtain  blood cultures, obtain as soon as possible, and treat patient with IV fluids and empiric ceftriaxone.  Patient and family expressed understanding and agreement this plan the patient is admitted for further workup and care.    FINAL IMPRESSION      1. Leukopenia, unspecified type    2. Adverse effect of chemotherapy, initial encounter    3. Dehydration    4. General weakness          DISPOSITION/PLAN     DISPOSITION Decision To Admit 02/24/2025 03:11:18 PM   DISPOSITION CONDITION Stable           (Please note that portions of this note were completed with a voice recognition program.  Efforts were made to edit the dictations but occasionally words are mis-transcribed.)    Hadley Ames MD (electronically signed)  Attending Emergency Physician           Hadley Ames MD  02/24/25 7415

## 2025-02-24 NOTE — ED NOTES
I sent a stool sample to the lab at this time. The patient was changed and placed in a new brief and purewick.

## 2025-02-25 ENCOUNTER — APPOINTMENT (OUTPATIENT)
Dept: CT IMAGING | Age: 80
End: 2025-02-25
Payer: MEDICARE

## 2025-02-25 ENCOUNTER — APPOINTMENT (OUTPATIENT)
Age: 80
End: 2025-02-25
Payer: MEDICARE

## 2025-02-25 PROBLEM — E87.6 HYPOKALEMIA: Status: ACTIVE | Noted: 2025-02-25

## 2025-02-25 PROBLEM — E86.0 DEHYDRATION: Status: ACTIVE | Noted: 2025-02-25

## 2025-02-25 PROBLEM — R60.0 LEG EDEMA, LEFT: Status: ACTIVE | Noted: 2025-02-25

## 2025-02-25 PROBLEM — N17.9 AKI (ACUTE KIDNEY INJURY): Status: ACTIVE | Noted: 2025-02-25

## 2025-02-25 PROBLEM — R13.10 DYSPHAGIA: Status: ACTIVE | Noted: 2025-02-25

## 2025-02-25 PROBLEM — R53.1 GENERAL WEAKNESS: Status: ACTIVE | Noted: 2025-02-25

## 2025-02-25 PROBLEM — E83.42 HYPOMAGNESEMIA: Status: ACTIVE | Noted: 2025-02-25

## 2025-02-25 PROBLEM — R19.7 DIARRHEA: Status: ACTIVE | Noted: 2025-02-25

## 2025-02-25 LAB
ACANTHOCYTES BLD QL SMEAR: ABNORMAL
ANION GAP SERPL CALCULATED.3IONS-SCNC: 18 MMOL/L (ref 3–16)
ANISOCYTOSIS BLD QL SMEAR: ABNORMAL
ANTI-XA UNFRAC HEPARIN: 0.25 IU/ML (ref 0.3–0.7)
ANTI-XA UNFRAC HEPARIN: 0.82 IU/ML (ref 0.3–0.7)
APTT BLD: 33.4 SEC (ref 22.1–36.4)
BASOPHILS # BLD: 0 K/UL (ref 0–0.2)
BASOPHILS NFR BLD: 0.3 %
BUN SERPL-MCNC: 54 MG/DL (ref 7–20)
BURR CELLS BLD QL SMEAR: ABNORMAL
CALCIUM SERPL-MCNC: 6.9 MG/DL (ref 8.3–10.6)
CHLORIDE SERPL-SCNC: 108 MMOL/L (ref 99–110)
CO2 SERPL-SCNC: 15 MMOL/L (ref 21–32)
CREAT SERPL-MCNC: 2.1 MG/DL (ref 0.6–1.2)
DACRYOCYTES BLD QL SMEAR: ABNORMAL
DEPRECATED RDW RBC AUTO: 15.5 % (ref 12.4–15.4)
DEPRECATED RDW RBC AUTO: 15.7 % (ref 12.4–15.4)
ECHO BSA: 1.79 M2
EOSINOPHIL # BLD: 0 K/UL (ref 0–0.6)
EOSINOPHIL NFR BLD: 0.2 %
GFR SERPLBLD CREATININE-BSD FMLA CKD-EPI: 23 ML/MIN/{1.73_M2}
GLUCOSE BLD-MCNC: 115 MG/DL (ref 70–99)
GLUCOSE BLD-MCNC: 117 MG/DL (ref 70–99)
GLUCOSE BLD-MCNC: 134 MG/DL (ref 70–99)
GLUCOSE SERPL-MCNC: 134 MG/DL (ref 70–99)
HCT VFR BLD AUTO: 25.5 % (ref 36–48)
HCT VFR BLD AUTO: 26.9 % (ref 36–48)
HGB BLD-MCNC: 8.6 G/DL (ref 12–16)
HGB BLD-MCNC: 9 G/DL (ref 12–16)
INR PPP: 1.35 (ref 0.85–1.15)
LACTATE BLDV-SCNC: 1.2 MMOL/L (ref 0.4–2)
LACTATE BLDV-SCNC: 1.3 MMOL/L (ref 0.4–2)
LYMPHOCYTES # BLD: 0.7 K/UL (ref 1–5.1)
LYMPHOCYTES NFR BLD: 23.8 %
MAGNESIUM SERPL-MCNC: 1.58 MG/DL (ref 1.8–2.4)
MCH RBC QN AUTO: 29.8 PG (ref 26–34)
MCH RBC QN AUTO: 29.8 PG (ref 26–34)
MCHC RBC AUTO-ENTMCNC: 33.3 G/DL (ref 31–36)
MCHC RBC AUTO-ENTMCNC: 33.8 G/DL (ref 31–36)
MCV RBC AUTO: 88.3 FL (ref 80–100)
MCV RBC AUTO: 89.6 FL (ref 80–100)
MONOCYTES # BLD: 0 K/UL (ref 0–1.3)
MONOCYTES NFR BLD: 1.1 %
NEUTROPHILS # BLD: 2.1 K/UL (ref 1.7–7.7)
NEUTROPHILS NFR BLD: 74.6 %
OVALOCYTES BLD QL SMEAR: ABNORMAL
PATH INTERP BLD-IMP: NORMAL
PERFORMED ON: ABNORMAL
PLATELET # BLD AUTO: 95 K/UL (ref 135–450)
PLATELET # BLD AUTO: 97 K/UL (ref 135–450)
PLATELET BLD QL SMEAR: ABNORMAL
PMV BLD AUTO: 8.3 FL (ref 5–10.5)
PMV BLD AUTO: 8.6 FL (ref 5–10.5)
POIKILOCYTOSIS BLD QL SMEAR: ABNORMAL
POTASSIUM SERPL-SCNC: 3 MMOL/L (ref 3.5–5.1)
PROTHROMBIN TIME: 16.8 SEC (ref 11.9–14.9)
RBC # BLD AUTO: 2.89 M/UL (ref 4–5.2)
RBC # BLD AUTO: 3 M/UL (ref 4–5.2)
SCHISTOCYTES BLD QL SMEAR: ABNORMAL
SLIDE REVIEW: ABNORMAL
SODIUM SERPL-SCNC: 141 MMOL/L (ref 136–145)
WBC # BLD AUTO: 2.8 K/UL (ref 4–11)
WBC # BLD AUTO: 2.8 K/UL (ref 4–11)

## 2025-02-25 PROCEDURE — 36415 COLL VENOUS BLD VENIPUNCTURE: CPT

## 2025-02-25 PROCEDURE — 2580000003 HC RX 258

## 2025-02-25 PROCEDURE — 6360000002 HC RX W HCPCS: Performed by: NURSE PRACTITIONER

## 2025-02-25 PROCEDURE — 83735 ASSAY OF MAGNESIUM: CPT

## 2025-02-25 PROCEDURE — 85520 HEPARIN ASSAY: CPT

## 2025-02-25 PROCEDURE — 2500000003 HC RX 250 WO HCPCS

## 2025-02-25 PROCEDURE — 85027 COMPLETE CBC AUTOMATED: CPT

## 2025-02-25 PROCEDURE — 93971 EXTREMITY STUDY: CPT | Performed by: SURGERY

## 2025-02-25 PROCEDURE — 6360000002 HC RX W HCPCS: Performed by: PEDIATRICS

## 2025-02-25 PROCEDURE — 2580000003 HC RX 258: Performed by: NURSE PRACTITIONER

## 2025-02-25 PROCEDURE — 85610 PROTHROMBIN TIME: CPT

## 2025-02-25 PROCEDURE — 6360000002 HC RX W HCPCS

## 2025-02-25 PROCEDURE — 83605 ASSAY OF LACTIC ACID: CPT

## 2025-02-25 PROCEDURE — 85025 COMPLETE CBC W/AUTO DIFF WBC: CPT

## 2025-02-25 PROCEDURE — 99233 SBSQ HOSP IP/OBS HIGH 50: CPT | Performed by: NURSE PRACTITIONER

## 2025-02-25 PROCEDURE — 2500000003 HC RX 250 WO HCPCS: Performed by: PEDIATRICS

## 2025-02-25 PROCEDURE — 85730 THROMBOPLASTIN TIME PARTIAL: CPT

## 2025-02-25 PROCEDURE — 74176 CT ABD & PELVIS W/O CONTRAST: CPT

## 2025-02-25 PROCEDURE — 6370000000 HC RX 637 (ALT 250 FOR IP)

## 2025-02-25 PROCEDURE — 2060000000 HC ICU INTERMEDIATE R&B

## 2025-02-25 PROCEDURE — 2580000003 HC RX 258: Performed by: PEDIATRICS

## 2025-02-25 PROCEDURE — 80048 BASIC METABOLIC PNL TOTAL CA: CPT

## 2025-02-25 PROCEDURE — 6370000000 HC RX 637 (ALT 250 FOR IP): Performed by: PEDIATRICS

## 2025-02-25 PROCEDURE — 93971 EXTREMITY STUDY: CPT

## 2025-02-25 RX ORDER — NIFEDIPINE 30 MG/1
30 TABLET, EXTENDED RELEASE ORAL DAILY
Status: DISCONTINUED | OUTPATIENT
Start: 2025-02-25 | End: 2025-02-28 | Stop reason: HOSPADM

## 2025-02-25 RX ORDER — HEPARIN SODIUM 1000 [USP'U]/ML
80 INJECTION, SOLUTION INTRAVENOUS; SUBCUTANEOUS ONCE
Status: COMPLETED | OUTPATIENT
Start: 2025-02-25 | End: 2025-02-25

## 2025-02-25 RX ORDER — INSULIN LISPRO 100 [IU]/ML
0-4 INJECTION, SOLUTION INTRAVENOUS; SUBCUTANEOUS
Status: DISCONTINUED | OUTPATIENT
Start: 2025-02-25 | End: 2025-02-28 | Stop reason: HOSPADM

## 2025-02-25 RX ORDER — SODIUM CHLORIDE 9 MG/ML
INJECTION, SOLUTION INTRAVENOUS CONTINUOUS
Status: DISCONTINUED | OUTPATIENT
Start: 2025-02-25 | End: 2025-02-27

## 2025-02-25 RX ORDER — MORPHINE SULFATE 2 MG/ML
2 INJECTION, SOLUTION INTRAMUSCULAR; INTRAVENOUS EVERY 4 HOURS PRN
Status: DISCONTINUED | OUTPATIENT
Start: 2025-02-25 | End: 2025-02-28 | Stop reason: HOSPADM

## 2025-02-25 RX ORDER — HEPARIN SODIUM 1000 [USP'U]/ML
80 INJECTION, SOLUTION INTRAVENOUS; SUBCUTANEOUS PRN
Status: DISCONTINUED | OUTPATIENT
Start: 2025-02-25 | End: 2025-02-28

## 2025-02-25 RX ORDER — DIPHENHYDRAMINE HYDROCHLORIDE 50 MG/ML
INJECTION INTRAMUSCULAR; INTRAVENOUS
Status: COMPLETED
Start: 2025-02-25 | End: 2025-02-25

## 2025-02-25 RX ORDER — ENOXAPARIN SODIUM 100 MG/ML
30 INJECTION SUBCUTANEOUS DAILY
Status: DISCONTINUED | OUTPATIENT
Start: 2025-02-26 | End: 2025-02-25

## 2025-02-25 RX ORDER — DEXTROSE MONOHYDRATE 100 MG/ML
INJECTION, SOLUTION INTRAVENOUS CONTINUOUS PRN
Status: DISCONTINUED | OUTPATIENT
Start: 2025-02-25 | End: 2025-02-28 | Stop reason: HOSPADM

## 2025-02-25 RX ORDER — LIDOCAINE 4 G/G
1 PATCH TOPICAL DAILY
Status: DISCONTINUED | OUTPATIENT
Start: 2025-02-25 | End: 2025-02-28 | Stop reason: HOSPADM

## 2025-02-25 RX ORDER — OXYCODONE HYDROCHLORIDE 5 MG/1
5 TABLET ORAL EVERY 4 HOURS PRN
Status: DISCONTINUED | OUTPATIENT
Start: 2025-02-25 | End: 2025-02-28 | Stop reason: HOSPADM

## 2025-02-25 RX ORDER — HEPARIN SODIUM 10000 [USP'U]/100ML
20 INJECTION, SOLUTION INTRAVENOUS CONTINUOUS
Status: DISCONTINUED | OUTPATIENT
Start: 2025-02-25 | End: 2025-02-28

## 2025-02-25 RX ORDER — FAMOTIDINE 10 MG/ML
20 INJECTION, SOLUTION INTRAVENOUS DAILY
Status: DISCONTINUED | OUTPATIENT
Start: 2025-02-25 | End: 2025-02-26

## 2025-02-25 RX ORDER — HEPARIN SODIUM 1000 [USP'U]/ML
40 INJECTION, SOLUTION INTRAVENOUS; SUBCUTANEOUS PRN
Status: DISCONTINUED | OUTPATIENT
Start: 2025-02-25 | End: 2025-02-28

## 2025-02-25 RX ORDER — DIPHENOXYLATE HYDROCHLORIDE AND ATROPINE SULFATE 2.5; .025 MG/1; MG/1
1 TABLET ORAL 4 TIMES DAILY PRN
Status: DISCONTINUED | OUTPATIENT
Start: 2025-02-25 | End: 2025-02-28 | Stop reason: HOSPADM

## 2025-02-25 RX ORDER — GLUCAGON 1 MG/ML
1 KIT INJECTION PRN
Status: DISCONTINUED | OUTPATIENT
Start: 2025-02-25 | End: 2025-02-28 | Stop reason: HOSPADM

## 2025-02-25 RX ADMIN — OXYCODONE 5 MG: 5 TABLET ORAL at 08:52

## 2025-02-25 RX ADMIN — MORPHINE SULFATE 2 MG: 2 INJECTION, SOLUTION INTRAMUSCULAR; INTRAVENOUS at 11:06

## 2025-02-25 RX ADMIN — MAGNESIUM SULFATE HEPTAHYDRATE 2000 MG: 40 INJECTION, SOLUTION INTRAVENOUS at 10:54

## 2025-02-25 RX ADMIN — FAMOTIDINE 20 MG: 10 INJECTION, SOLUTION INTRAVENOUS at 08:52

## 2025-02-25 RX ADMIN — SODIUM CHLORIDE, PRESERVATIVE FREE 10 ML: 5 INJECTION INTRAVENOUS at 09:00

## 2025-02-25 RX ADMIN — CEFTRIAXONE SODIUM 2000 MG: 2 INJECTION, POWDER, FOR SOLUTION INTRAMUSCULAR; INTRAVENOUS at 08:57

## 2025-02-25 RX ADMIN — HEPARIN SODIUM 18 UNITS/KG/HR: 10000 INJECTION, SOLUTION INTRAVENOUS at 16:53

## 2025-02-25 RX ADMIN — POTASSIUM CHLORIDE 10 MEQ: 7.46 INJECTION, SOLUTION INTRAVENOUS at 16:29

## 2025-02-25 RX ADMIN — HEPARIN SODIUM 5900 UNITS: 1000 INJECTION INTRAVENOUS; SUBCUTANEOUS at 16:50

## 2025-02-25 RX ADMIN — SODIUM CHLORIDE, PRESERVATIVE FREE 10 ML: 5 INJECTION INTRAVENOUS at 19:51

## 2025-02-25 RX ADMIN — PANTOPRAZOLE SODIUM 40 MG: 40 INJECTION, POWDER, LYOPHILIZED, FOR SOLUTION INTRAVENOUS at 16:54

## 2025-02-25 RX ADMIN — SODIUM CHLORIDE: 9 INJECTION, SOLUTION INTRAVENOUS at 13:48

## 2025-02-25 RX ADMIN — ONDANSETRON 4 MG: 4 TABLET, ORALLY DISINTEGRATING ORAL at 08:52

## 2025-02-25 RX ADMIN — POTASSIUM CHLORIDE 10 MEQ: 7.46 INJECTION, SOLUTION INTRAVENOUS at 13:50

## 2025-02-25 RX ADMIN — POTASSIUM CHLORIDE 10 MEQ: 7.46 INJECTION, SOLUTION INTRAVENOUS at 12:18

## 2025-02-25 RX ADMIN — ENOXAPARIN SODIUM 40 MG: 100 INJECTION SUBCUTANEOUS at 10:57

## 2025-02-25 RX ADMIN — SODIUM CHLORIDE, SODIUM LACTATE, POTASSIUM CHLORIDE, AND CALCIUM CHLORIDE: .6; .31; .03; .02 INJECTION, SOLUTION INTRAVENOUS at 00:27

## 2025-02-25 RX ADMIN — SODIUM CHLORIDE: 9 INJECTION, SOLUTION INTRAVENOUS at 10:09

## 2025-02-25 RX ADMIN — POTASSIUM CHLORIDE 10 MEQ: 7.46 INJECTION, SOLUTION INTRAVENOUS at 15:29

## 2025-02-25 RX ADMIN — POTASSIUM CHLORIDE 10 MEQ: 7.46 INJECTION, SOLUTION INTRAVENOUS at 10:56

## 2025-02-25 RX ADMIN — POTASSIUM CHLORIDE 10 MEQ: 7.46 INJECTION, SOLUTION INTRAVENOUS at 17:49

## 2025-02-25 ASSESSMENT — LIFESTYLE VARIABLES
HOW OFTEN DO YOU HAVE A DRINK CONTAINING ALCOHOL: NEVER
HOW MANY STANDARD DRINKS CONTAINING ALCOHOL DO YOU HAVE ON A TYPICAL DAY: PATIENT DOES NOT DRINK

## 2025-02-25 ASSESSMENT — PAIN DESCRIPTION - ORIENTATION
ORIENTATION: LEFT
ORIENTATION: LEFT

## 2025-02-25 ASSESSMENT — PAIN SCALES - GENERAL
PAINLEVEL_OUTOF10: 7

## 2025-02-25 ASSESSMENT — PAIN DESCRIPTION - LOCATION
LOCATION: LEG
LOCATION: LEG

## 2025-02-25 ASSESSMENT — PAIN DESCRIPTION - DESCRIPTORS: DESCRIPTORS: PRESSURE

## 2025-02-25 NOTE — CONSULTS
Gastroenterology Consult Note    Patient:   Aniat Zamora   :    1945   Facility:   [facility]  Referring/PCP: Malinda Quiros APRN - CNP  Date:     2025  Consultant:   FAY Parra CNP      Chief Complaint   Patient presents with    Fatigue     Patient reported to have increased weakness and fatigue. Recently had second dose of chemo treatment. Patient also endorses nausea and diarrhea.         History of Present illness   Patient is an 78 yo female with pmx of peritoneal carcinomatosis on chemotherapy and HTN who presented to ED  with c/o weakness. Patient's family is at bedside and help provide history. She reports ongoing diarrhea that has worsened since starting chemotherapy. Her last round of chemotherapy was . She reports decreased appetite and weight loss of 7 lbs in three weeks. She reports ongoing dysphagia to solids and medications. She denies fever, chills, nausea, vomiting, abdominal pain, melena, and hematochezia. Patient's family reports her taking loperamide and dicyclomine without relief of diarrhea. She follows with [facility] for peritoneal carcinomatosis. Her most recent dose of chemotherapy was half of normal dose due to intolerance. She reports her last colonoscopy was about two years ago at [facility]. She denies ever having an EGD.     Past Medical History:   Diagnosis Date    Acid reflux     on medication,but does not know name of it    Back pain     uses Lidocaine patches as needed    Hypertension      Past Surgical History:   Procedure Laterality Date    CT BIOPSY LYMPH NODES SUPERFICIAL  2025    CT BIOPSY LYMPH NODES SUPERFICIAL 2025 [facility] [place] [place] CT SCAN       Social:   Social History     Tobacco Use    Smoking status: Never    Smokeless tobacco: Never   Substance Use Topics    Alcohol use: Not on file     Family: History reviewed. No pertinent family history.  No current facility-administered medications on file prior to  at midnight  Stop Heparin gtt 6 hours before EGD (0330)   Plan for EGD tomorrow    Melanie SERRANO Natanael, FAY - CNP  4:10 PM 2/25/2025                      79-year-old female with history of HTN, CKD, chronic back pain, GERD, arthritis, ovarian cancer status post salpingo-oophorectomy, radiation and chemotherapy (1994) and recently diagnosed primary peritoneal carcinomatosis status post 2 cycles of chemotherapy admitted with abdominal pain and diarrhea.  She also complains of new onset dysphagia.    Continue supportive care.  Check stool tests.  Proceed with diagnostic and therapeutic EGD for dysphagia.  PPI daily.  Oncology consult.  Poor prognosis.    Abner Gustafson MD          (O) [phone]  (O) [phone]

## 2025-02-25 NOTE — PROGRESS NOTES
Patient admitted to room 323 from ER. Patient oriented to room, call light, bed rails, phone, lights and bathroom. Patient instructed about the schedule of the day including: vital sign frequency, lab draws, possible tests, frequency of MD and staff rounds, daily weights, I &O's and prescribed diet.  Telemetry box in place, patient aware of placement and reason. Bed locked, in lowest position, side rails up 2/4, call light within reach.        Recliner Assessment  Patient is able to demonstrate the ability to move from a reclining position to an upright position within the recliner.       4 Eyes Skin Assessment     NAME:  Anita Zamora  YOB: 1945  MEDICAL RECORD NUMBER:  [id number]    The patient is being assessed for  Admission    I agree that at least one RN has performed a thorough Head to Toe Skin Assessment on the patient. ALL assessment sites listed below have been assessed.      Areas assessed by both nurses:    Head, Face, Ears, Shoulders, Back, Chest, Arms, Elbows, Hands, Sacrum. Buttock, Coccyx, Ischium, Legs. Feet and Heels, and Under Medical Devices         Does the Patient have a Wound? No noted wound(s)       Blayne Prevention initiated by RN: Yes  Wound Care Orders initiated by RN: Yes    Pressure Injury (Stage 3,4, Unstageable, DTI, NWPT, and Complex wounds) if present, place Wound referral order by RN under : No    New Ostomies, if present place, Ostomy referral order under : No     Nurse 1 eSignature: Electronically signed by Pamela Gentile RN on 2/25/25 at 1:52 PM EST    **SHARE this note so that the co-signing nurse can place an eSignature**    Nurse 2 eSignature: Electronically signed by Jossie Hercules RN on 2/25/25 at 1:54 PM EST

## 2025-02-25 NOTE — PROGRESS NOTES
Bedside swallow eval performed. Patient took small sips of water without noted issues. Patient given oral meds again with no overt issues noted. Patient endorses overall lack of appetite and nausea but denies swallowing issues

## 2025-02-25 NOTE — PLAN OF CARE
Venous ultrasound preliminary  report reviewed with Dr. Kaye. Will order heparin gtt.  Discussed with patient and family at bedside.  Also informed GI that patient has DVT and is going on heparin gtt.       Mechelle Henderson, FAY - CNP

## 2025-02-25 NOTE — PROGRESS NOTES
Approached by hospice nurses who met with patient and family, patient states she does not want to sign on with hospice at this time. Patient wants to speak with oncologist regarding other options.   Patient relayed to hospice that she may be interested in their care transitions program.

## 2025-02-25 NOTE — ED NOTES
0810 - Placed routine consult to GI d/t dysphagia, chronic diarrhea. Dr. Vanegas is the provider on call at this time; advised no call back is necessary as this is in IP consult.

## 2025-02-25 NOTE — PROGRESS NOTES
Report called to PCU RNPamela. Patient transported to CT scan and then to floor with RN escort. All belongings with patient at transfer

## 2025-02-25 NOTE — CONSULTS
[facility]    Consult Note      Requesting Physician:  Dima Rocha MD    CHIEF COMPLAINT:  generalized weakness and diarrhea      HISTORY OF PRESENT ILLNESS:    Ms. Zamora  is a 79 y.o. female we are seeing in consultation for primary peritoneal cancer. She has been on chemotherapy with carbo/taxol and received treatment last week at 50% dose after not tolerating C1. She presented with diarrhea and generalized weakness. Also reports leg swelling. Neutropenic on labs. Afebrile. States she wants no additional chemotherapy.       ICD-10-CM    1. Leukopenia, unspecified type  D72.819       2. Adverse effect of chemotherapy, initial encounter  T45.1X5A       3. Dehydration  E86.0       4. General weakness  R53.1       5. 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 Leg edema, left  R60.0 Vascular duplex lower extremity venous bilateral     Vascular duplex lower extremity venous bilateral             Past Medical History:  Past Medical History:   Diagnosis Date    Acid reflux     on medication,but does not know name of it    Back pain     uses Lidocaine patches as needed    Hypertension        Past Surgical History:  Past Surgical History:   Procedure Laterality Date    CT BIOPSY LYMPH NODES SUPERFICIAL  1/13/2025    CT BIOPSY LYMPH NODES SUPERFICIAL 1/13/2025 [facility] CT SCAN       Current Medications:  Current Facility-Administered Medications   Medication Dose Route Frequency Provider Last Rate Last Admin    diphenoxylate-atropine (LOMOTIL) 2.5-0.025 MG per tablet 1 tablet  1 tablet Oral 4x Daily PRN Dima Rocha MD        famotidine (PEPCID) injection 20 mg  20 mg IntraVENous Daily Dima Rocha MD        lidocaine 4 % external patch 1 patch  1 patch Topical Daily Dima Rocha MD        NIFEdipine (PROCARDIA XL) extended release tablet 30  mg  30 mg Oral Daily Dima Rocha MD        oxyCODONE (ROXICODONE) immediate release tablet 5 mg  5 mg Oral Q4H PRN Dima Rocha MD        glucose chewable tablet 16 g  4 tablet Oral PRN Dima Rocha MD        dextrose bolus 10% 125 mL  125 mL IntraVENous PRN Dima Rocha MD        Or    dextrose bolus 10% 250 mL  250 mL IntraVENous PRN Dima Rocha MD        glucagon injection 1 mg  1 mg SubCUTAneous PRN Dima Rocha MD        dextrose 10 % infusion   IntraVENous Continuous PRN Dima Rocha MD        insulin lispro (HUMALOG,ADMELOG) injection vial 0-4 Units  0-4 Units SubCUTAneous 4x Daily AC & HS Dima Rocha MD        0.9 % sodium chloride infusion   IntraVENous Continuous Henderson, FAY Min - CNP        sodium chloride flush 0.9 % injection 5-40 mL  5-40 mL IntraVENous 2 times per day Eboni Plilai PA-C        sodium chloride flush 0.9 % injection 5-40 mL  5-40 mL IntraVENous PRN Eboni Pillai PA-C        0.9 % sodium chloride infusion   IntraVENous PRN Eboni Pillai PA-C        potassium chloride (KLOR-CON M) extended release tablet 40 mEq  40 mEq Oral PRN Eboni Pillai PA-C        Or    potassium bicarb-citric acid (EFFER-K) effervescent tablet 40 mEq  40 mEq Oral PRN Eboni Pillai PA-C        Or    potassium chloride 10 mEq/100 mL IVPB (Peripheral Line)  10 mEq IntraVENous PRN Eboni Pillai PA-C        magnesium sulfate 2000 mg in 50 mL IVPB premix  2,000 mg IntraVENous PRN Eboni Pillai PA-C        ondansetron (ZOFRAN-ODT) disintegrating tablet 4 mg  4 mg Oral Q8H PRN Eboni Pillai PA-C        Or    ondansetron (ZOFRAN) injection 4 mg  4 mg IntraVENous Q6H PRN Eboni Pillai PA-C        polyethylene glycol (GLYCOLAX) packet 17 g  17 g Oral Daily PRN Eboni Pillai PA-C        cefTRIAXone (ROCEPHIN) 2,000 mg in sodium chloride 0.9 % 50 mL IVPB (mini-bag)  2,000 mg IntraVENous Q24H Dima Rocha MD

## 2025-02-25 NOTE — PROGRESS NOTES
Intermittent bladder scan performed per order. Bladder noted to have appx. 119 ml fluid. Patient does not currently feel the urge to go, pure wick in place. Reported to have one large occurrence early morning of stool and urine unmeasured in depends from previous staff.

## 2025-02-25 NOTE — CONSULTS
Pharmacy to dose Heparin protocol High dose DVT/PE:  Once the baseline aPTT has been collected give a 5,900 unit IV bolus dose of heparin and begin the infusion at 18units/kg/hr.  Check an anti-Xa 6hrs later.  Desired range is 0.3-0.7IU/mL.  Patient's reported weight is 73.3kg.    anti-Xa < 0.1          Heparin 80 units/kg bolus (max 10,000 units)   Increase infusion by 4 units/kg/hr  anti-Xa 0.1-0.29     Heparin 40 units/kg bolus (max 5,000 units)     Increase infusion by 2 units/kg/hr  anti-Xa 0.3-0.7       No bolus                                                            No change   anti-Xa 0.71-0.8     No bolus                                                            Decrease infusion by 1 units/kg/hr  anti-Xa 0.81-0.99   No bolus                                                            Decrease infusion by 2 units/kg/hr  anti-Xa 1 or more   Hold heparin for 1 hour                                     Decrease infusion by 3 units/kg/hr    When Anti-Xa  is within target range for two consecutive times, check Anti-Xa once daily with morning labs.   Abelardo Ramos RPH PharmD 2/25/2025 4:46 PM

## 2025-02-25 NOTE — PROGRESS NOTES
D pt sbp trending down as documented.   A pt mentation appropriately when assessed. States blood pressure is always low. Hospitalist paged.   R will continue to monitor per protocol.

## 2025-02-25 NOTE — H&P
V2.0  History and Physical      Name:  Anita Zamora /Age/Sex: 1945  (79 y.o. female)   MRN & CSN:  [id number] & [id number] Encounter Date/Time: 2025 9:25 PM EST   Location:   PCP: Malinda Quiros APRN - CNP       Hospital Day: 1    Assessment and Plan:   Anita Zamora is a 79 y.o. female with a pmh of       Past Medical History:   Diagnosis Date    Acid reflux     on medication,but does not know name of it    Back pain     uses Lidocaine patches as needed    Hypertension          who presents with Leukopenia    Hospital Problems             Last Modified POA    * (Principal) Leukopenia 2025 Yes        Peritoneal carcinomatosis with nausea, uncontrolled diarrhea, dehydration, elevated lactic acid:   - GI consulted     - managed by [facility] with Dr Valiente - getting chemotherapy - 21 day cycles, cycle 1 day 1 was on 25 (6 cycles planned), receiving paclitaxel and carboplatin and not well tolerated so dose reduced 50% on subsequent cycle (see oncology note 25)  - received cycle 2 chemo 25     - lomotil QID PRN   - s/p 1000 ml NS support in ED   - LR @ 75 ml/hr     - oncology consulted \"I don't want anymore chemo\"   - palliative care consulted to review goals of care     SIRS in neutropenic patient:   - presenting ANC of 1000 (moderate neutropenia), temp 96.5, , BP 73/50   - blood cultures pending   - ceftriaxone IV as precaution   - UA pending (if patient able to provide sample)   - neutropenic isolation   - telemetry   - hold any anti-pyretics so as not to suppress any fevers (opiates prn pain)     - denies having PICC or PORT     Abdominal pain with malignancy:   - dicyclomine 10 mg po - HELD - pt denies   - oxycodone 5 mg PO q4 hrs prn pain     Poor appetite:   - remeron -report not taking - \"I don't want to\" - HELD   - marinol - pt reports not taking   - calorie supplements TID   - dietician consulted     Ascites:   - paracentesis listed in plan of oncology note

## 2025-02-25 NOTE — CONSULTS
[facility]  Palliative Medicine Consultation Note      Date Of Admission:2/24/2025  Date of consult: 02/25/25  Seen by PC in the past:  No    Recommendations:        Writer met with the pt at bedside to introduce palliative/hospice options and had a voluntary discussion related to advance care planning. Hospice list provided to the pt. Pt states she does not want to continue with chemotherapy at this time. Pt states she is not ready to die and unsure about hospice services,however, willing to meet with a hospice nurse from [facility] today with daughter,Nayeli,HC POA. Referral called to Trinh with [facility]; awaiting meeting time. Per Trinh [facility] has followed up recently with pt and family as they had received consult from [facility].     Per [facility] nurse will meet with pt and pt's daughter,Nayeli, at 1400 today.      1. Goals of Care/Advanced Care planning/Code status: DNR-CCA  2. Pain: managed per nursing staff  3. SOB: denies  4. Disposition: to be decided    Reason for Consult:         [x]  Goals of Care  []  Code Status Discussion/Advanced Care Planning   []  Psychosocial/Family Support  []  Symptom Management  []  Other (Specify)    Requesting Physician: Dr. Dima Rocha    CHIEF COMPLAINT:  Leukopenia    History Obtained From:  patient, electronic medical record    History of Present Illness:         Anita Zamora is a 79 y.o. female with PMH of (see below list) who presented with BLE weakness and unable to ambulate,brought in by squad, ongoing diarrhea, and dehydration. Pt active with [facility] managed by  and no longer wanting to continue with chemotherapy. Pt     Subjective:         Past Medical History:        Diagnosis Date    Acid reflux     on medication,but does not know name of it    Back pain     uses Lidocaine patches as needed    Hypertension        Past Surgical History:        Procedure Laterality Date    CT BIOPSY LYMPH NODES SUPERFICIAL  1/13/2025    CT BIOPSY LYMPH NODES SUPERFICIAL  \"COLORU\", \"PHUR\", \"LABCAST\", \"WBCUA\", \"RBCUA\", \"MUCUS\", \"TRICHOMONAS\", \"YEAST\", \"BACTERIA\", \"CLARITYU\", \"SPECGRAV\", \"LEUKOCYTESUR\", \"UROBILINOGEN\", \"BILIRUBINUR\", \"BLOODU\", \"GLUCOSEU\", \"AMORPHOUS\" in the last 72 hours.    Invalid input(s): \"KETONESU\"    XR CHEST PORTABLE   Final Result   Low lung volumes with mild bibasilar opacities favored to represent   atelectasis rather than pneumonia.         Vascular duplex lower extremity venous bilateral    (Results Pending)         Conclusion/Time spent:         Recommendations see above    Time spent with patient and/or family: 35 min  Time reviewing records: 10 min   Time communicating with staff: 5 min     A total of  minutes spent with the patient and family on unit greater than 50% in counseling regarding palliative care and in goals of care for the patient.    Thank you to Dr. Dima Rocha for this consultation.

## 2025-02-25 NOTE — PROGRESS NOTES
Progress Note    Admit Date:  2/24/2025      Subjective:  Ms. Zamora is resting in bed.  Stated LLE edema is worse and going up her leg.  She has had increase in BLE pain recently. No further diarrhea today.  Low urine output.    Objective:   Patient Vitals for the past 4 hrs:   BP Pulse Resp SpO2   02/25/25 0700 102/66 87 24 --   02/25/25 0600 (!) 108/59 87 19 --   02/25/25 0500 (!) 89/62 85 -- 100 %          Intake/Output Summary (Last 24 hours) at 2/25/2025 0806  Last data filed at 2/24/2025 1745  Gross per 24 hour   Intake 3132 ml   Output --   Net 3132 ml       Physical Exam:    Gen: No distress. Alert. Appears pale, chronically ill  Eyes: PERRL. No sclera icterus. No conjunctival injection.   ENT: No discharge. Pharynx clear.   Neck: No JVD.  Trachea midline.  Resp: No accessory muscle use. No crackles. No wheezes. No rhonchi.   CV: Regular rate. Regular rhythm. No murmur.  No rub  Capillary Refill: Brisk,< 3 seconds   GI: Non-tender. Non-distended.  Normal bowel sounds.  Skin: Warm and dry. No nodule on exposed extremities. No rash on exposed extremities.   +++LLE edema all the way up to thigh pulses obtained with doppler due to swelling.  Neurovascularly intact. Warm to touch   M/S: No cyanosis. No joint deformity. No clubbing.   Neuro: Awake. Grossly nonfocal    Psych: Oriented x 3. No anxiety or agitation.      Scheduled Meds:   famotidine  20 mg IntraVENous Daily    lidocaine  1 patch Topical Daily    NIFEdipine  30 mg Oral Daily    insulin lispro  0-4 Units SubCUTAneous 4x Daily AC & HS    sodium chloride flush  5-40 mL IntraVENous 2 times per day    cefTRIAXone (ROCEPHIN) IV  2,000 mg IntraVENous Q24H    enoxaparin  40 mg SubCUTAneous Daily       Continuous Infusions:   dextrose      sodium chloride         PRN Meds:  diphenoxylate-atropine, oxyCODONE, glucose, dextrose bolus **OR** dextrose bolus, glucagon (rDNA), dextrose, sodium chloride flush, sodium chloride, potassium chloride **OR** potassium

## 2025-02-26 ENCOUNTER — ANESTHESIA EVENT (OUTPATIENT)
Dept: ENDOSCOPY | Age: 80
End: 2025-02-26
Payer: MEDICARE

## 2025-02-26 ENCOUNTER — ANESTHESIA (OUTPATIENT)
Dept: ENDOSCOPY | Age: 80
End: 2025-02-26
Payer: MEDICARE

## 2025-02-26 LAB
ANION GAP SERPL CALCULATED.3IONS-SCNC: 14 MMOL/L (ref 3–16)
ANTI-XA UNFRAC HEPARIN: 0.25 IU/ML (ref 0.3–0.7)
ANTI-XA UNFRAC HEPARIN: 0.59 IU/ML (ref 0.3–0.7)
BASOPHILS # BLD: 0 K/UL (ref 0–0.2)
BASOPHILS NFR BLD: 0.1 %
BUN SERPL-MCNC: 59 MG/DL (ref 7–20)
CALCIUM SERPL-MCNC: 6.7 MG/DL (ref 8.3–10.6)
CHLORIDE SERPL-SCNC: 109 MMOL/L (ref 99–110)
CO2 SERPL-SCNC: 16 MMOL/L (ref 21–32)
CREAT SERPL-MCNC: 2 MG/DL (ref 0.6–1.2)
DEPRECATED RDW RBC AUTO: 15.4 % (ref 12.4–15.4)
EOSINOPHIL # BLD: 0 K/UL (ref 0–0.6)
EOSINOPHIL NFR BLD: 1.2 %
EST. AVERAGE GLUCOSE BLD GHB EST-MCNC: 108.3 MG/DL
GFR SERPLBLD CREATININE-BSD FMLA CKD-EPI: 25 ML/MIN/{1.73_M2}
GLUCOSE BLD-MCNC: 103 MG/DL (ref 70–99)
GLUCOSE BLD-MCNC: 104 MG/DL (ref 70–99)
GLUCOSE BLD-MCNC: 107 MG/DL (ref 70–99)
GLUCOSE BLD-MCNC: 108 MG/DL (ref 70–99)
GLUCOSE SERPL-MCNC: 111 MG/DL (ref 70–99)
HBA1C MFR BLD: 5.4 %
HCT VFR BLD AUTO: 25.7 % (ref 36–48)
HGB BLD-MCNC: 8.6 G/DL (ref 12–16)
INR PPP: 1.29 (ref 0.85–1.15)
LYMPHOCYTES # BLD: 0.6 K/UL (ref 1–5.1)
LYMPHOCYTES NFR BLD: 24.7 %
MAGNESIUM SERPL-MCNC: 1.94 MG/DL (ref 1.8–2.4)
MCH RBC QN AUTO: 29.9 PG (ref 26–34)
MCHC RBC AUTO-ENTMCNC: 33.7 G/DL (ref 31–36)
MCV RBC AUTO: 88.7 FL (ref 80–100)
MONOCYTES # BLD: 0 K/UL (ref 0–1.3)
MONOCYTES NFR BLD: 1.6 %
NEUTROPHILS # BLD: 1.8 K/UL (ref 1.7–7.7)
NEUTROPHILS NFR BLD: 72.4 %
PERFORMED ON: ABNORMAL
PLATELET # BLD AUTO: 87 K/UL (ref 135–450)
PMV BLD AUTO: 8.9 FL (ref 5–10.5)
POTASSIUM SERPL-SCNC: 3.1 MMOL/L (ref 3.5–5.1)
PROTHROMBIN TIME: 16.3 SEC (ref 11.9–14.9)
RBC # BLD AUTO: 2.89 M/UL (ref 4–5.2)
SODIUM SERPL-SCNC: 139 MMOL/L (ref 136–145)
WBC # BLD AUTO: 2.4 K/UL (ref 4–11)

## 2025-02-26 PROCEDURE — 6360000002 HC RX W HCPCS: Performed by: PEDIATRICS

## 2025-02-26 PROCEDURE — 85610 PROTHROMBIN TIME: CPT

## 2025-02-26 PROCEDURE — 6370000000 HC RX 637 (ALT 250 FOR IP): Performed by: PEDIATRICS

## 2025-02-26 PROCEDURE — 3609012400 HC EGD TRANSORAL BIOPSY SINGLE/MULTIPLE: Performed by: INTERNAL MEDICINE

## 2025-02-26 PROCEDURE — 2709999900 HC NON-CHARGEABLE SUPPLY: Performed by: INTERNAL MEDICINE

## 2025-02-26 PROCEDURE — 99232 SBSQ HOSP IP/OBS MODERATE 35: CPT | Performed by: INTERNAL MEDICINE

## 2025-02-26 PROCEDURE — 88342 IMHCHEM/IMCYTCHM 1ST ANTB: CPT

## 2025-02-26 PROCEDURE — 2580000003 HC RX 258: Performed by: PEDIATRICS

## 2025-02-26 PROCEDURE — 0DB78ZX EXCISION OF STOMACH, PYLORUS, VIA NATURAL OR ARTIFICIAL OPENING ENDOSCOPIC, DIAGNOSTIC: ICD-10-PCS | Performed by: INTERNAL MEDICINE

## 2025-02-26 PROCEDURE — 88305 TISSUE EXAM BY PATHOLOGIST: CPT

## 2025-02-26 PROCEDURE — 2580000003 HC RX 258: Performed by: NURSE PRACTITIONER

## 2025-02-26 PROCEDURE — 85025 COMPLETE CBC W/AUTO DIFF WBC: CPT

## 2025-02-26 PROCEDURE — 6360000002 HC RX W HCPCS: Performed by: NURSE PRACTITIONER

## 2025-02-26 PROCEDURE — 6370000000 HC RX 637 (ALT 250 FOR IP)

## 2025-02-26 PROCEDURE — 36415 COLL VENOUS BLD VENIPUNCTURE: CPT

## 2025-02-26 PROCEDURE — 88312 SPECIAL STAINS GROUP 1: CPT

## 2025-02-26 PROCEDURE — 6360000002 HC RX W HCPCS: Performed by: NURSE ANESTHETIST, CERTIFIED REGISTERED

## 2025-02-26 PROCEDURE — 2500000003 HC RX 250 WO HCPCS

## 2025-02-26 PROCEDURE — 7100000010 HC PHASE II RECOVERY - FIRST 15 MIN: Performed by: INTERNAL MEDICINE

## 2025-02-26 PROCEDURE — 85520 HEPARIN ASSAY: CPT

## 2025-02-26 PROCEDURE — 2060000000 HC ICU INTERMEDIATE R&B

## 2025-02-26 PROCEDURE — 3700000000 HC ANESTHESIA ATTENDED CARE: Performed by: INTERNAL MEDICINE

## 2025-02-26 PROCEDURE — 7100000011 HC PHASE II RECOVERY - ADDTL 15 MIN: Performed by: INTERNAL MEDICINE

## 2025-02-26 PROCEDURE — 0DB38ZX EXCISION OF LOWER ESOPHAGUS, VIA NATURAL OR ARTIFICIAL OPENING ENDOSCOPIC, DIAGNOSTIC: ICD-10-PCS | Performed by: INTERNAL MEDICINE

## 2025-02-26 PROCEDURE — 80048 BASIC METABOLIC PNL TOTAL CA: CPT

## 2025-02-26 PROCEDURE — 6360000002 HC RX W HCPCS

## 2025-02-26 PROCEDURE — 2580000003 HC RX 258: Performed by: NURSE ANESTHETIST, CERTIFIED REGISTERED

## 2025-02-26 PROCEDURE — 2500000003 HC RX 250 WO HCPCS: Performed by: PEDIATRICS

## 2025-02-26 PROCEDURE — 2580000003 HC RX 258

## 2025-02-26 PROCEDURE — 83036 HEMOGLOBIN GLYCOSYLATED A1C: CPT

## 2025-02-26 PROCEDURE — 88341 IMHCHEM/IMCYTCHM EA ADD ANTB: CPT

## 2025-02-26 PROCEDURE — 83735 ASSAY OF MAGNESIUM: CPT

## 2025-02-26 RX ORDER — SODIUM CHLORIDE, SODIUM LACTATE, POTASSIUM CHLORIDE, CALCIUM CHLORIDE 600; 310; 30; 20 MG/100ML; MG/100ML; MG/100ML; MG/100ML
INJECTION, SOLUTION INTRAVENOUS
Status: DISCONTINUED | OUTPATIENT
Start: 2025-02-26 | End: 2025-02-26 | Stop reason: SDUPTHER

## 2025-02-26 RX ORDER — SODIUM CHLORIDE 0.9 % (FLUSH) 0.9 %
5-40 SYRINGE (ML) INJECTION EVERY 12 HOURS SCHEDULED
Status: DISCONTINUED | OUTPATIENT
Start: 2025-02-26 | End: 2025-02-26 | Stop reason: HOSPADM

## 2025-02-26 RX ORDER — DIPHENHYDRAMINE HYDROCHLORIDE 50 MG/ML
12.5 INJECTION INTRAMUSCULAR; INTRAVENOUS
Status: DISCONTINUED | OUTPATIENT
Start: 2025-02-26 | End: 2025-02-26 | Stop reason: HOSPADM

## 2025-02-26 RX ORDER — LIDOCAINE HYDROCHLORIDE 20 MG/ML
INJECTION, SOLUTION INFILTRATION; PERINEURAL
Status: DISCONTINUED | OUTPATIENT
Start: 2025-02-26 | End: 2025-02-26 | Stop reason: SDUPTHER

## 2025-02-26 RX ORDER — NALOXONE HYDROCHLORIDE 0.4 MG/ML
INJECTION, SOLUTION INTRAMUSCULAR; INTRAVENOUS; SUBCUTANEOUS PRN
Status: DISCONTINUED | OUTPATIENT
Start: 2025-02-26 | End: 2025-02-26 | Stop reason: HOSPADM

## 2025-02-26 RX ORDER — SODIUM CHLORIDE 9 MG/ML
INJECTION, SOLUTION INTRAVENOUS PRN
Status: DISCONTINUED | OUTPATIENT
Start: 2025-02-26 | End: 2025-02-26 | Stop reason: HOSPADM

## 2025-02-26 RX ORDER — LABETALOL HYDROCHLORIDE 5 MG/ML
5 INJECTION, SOLUTION INTRAVENOUS EVERY 10 MIN PRN
Status: DISCONTINUED | OUTPATIENT
Start: 2025-02-26 | End: 2025-02-26 | Stop reason: HOSPADM

## 2025-02-26 RX ORDER — ONDANSETRON 2 MG/ML
4 INJECTION INTRAMUSCULAR; INTRAVENOUS
Status: DISCONTINUED | OUTPATIENT
Start: 2025-02-26 | End: 2025-02-26 | Stop reason: HOSPADM

## 2025-02-26 RX ORDER — OXYCODONE HYDROCHLORIDE 5 MG/1
5 TABLET ORAL PRN
Status: DISCONTINUED | OUTPATIENT
Start: 2025-02-26 | End: 2025-02-26 | Stop reason: HOSPADM

## 2025-02-26 RX ORDER — PROPOFOL 10 MG/ML
INJECTION, EMULSION INTRAVENOUS
Status: DISCONTINUED | OUTPATIENT
Start: 2025-02-26 | End: 2025-02-26 | Stop reason: SDUPTHER

## 2025-02-26 RX ORDER — SODIUM CHLORIDE 0.9 % (FLUSH) 0.9 %
5-40 SYRINGE (ML) INJECTION PRN
Status: DISCONTINUED | OUTPATIENT
Start: 2025-02-26 | End: 2025-02-26 | Stop reason: HOSPADM

## 2025-02-26 RX ORDER — OXYCODONE HYDROCHLORIDE 5 MG/1
10 TABLET ORAL PRN
Status: DISCONTINUED | OUTPATIENT
Start: 2025-02-26 | End: 2025-02-26 | Stop reason: HOSPADM

## 2025-02-26 RX ADMIN — PROPOFOL 100 MG: 10 INJECTION, EMULSION INTRAVENOUS at 10:51

## 2025-02-26 RX ADMIN — SODIUM CHLORIDE, PRESERVATIVE FREE 10 ML: 5 INJECTION INTRAVENOUS at 08:05

## 2025-02-26 RX ADMIN — PANTOPRAZOLE SODIUM 40 MG: 40 INJECTION, POWDER, LYOPHILIZED, FOR SOLUTION INTRAVENOUS at 04:35

## 2025-02-26 RX ADMIN — LIDOCAINE HYDROCHLORIDE 60 MG: 20 INJECTION, SOLUTION INFILTRATION; PERINEURAL at 10:51

## 2025-02-26 RX ADMIN — CEFTRIAXONE SODIUM 2000 MG: 2 INJECTION, POWDER, FOR SOLUTION INTRAMUSCULAR; INTRAVENOUS at 08:07

## 2025-02-26 RX ADMIN — PANTOPRAZOLE SODIUM 40 MG: 40 INJECTION, POWDER, LYOPHILIZED, FOR SOLUTION INTRAVENOUS at 16:31

## 2025-02-26 RX ADMIN — SODIUM CHLORIDE: 9 INJECTION, SOLUTION INTRAVENOUS at 01:21

## 2025-02-26 RX ADMIN — NIFEDIPINE 30 MG: 30 TABLET, FILM COATED, EXTENDED RELEASE ORAL at 08:05

## 2025-02-26 RX ADMIN — OXYCODONE 5 MG: 5 TABLET ORAL at 22:17

## 2025-02-26 RX ADMIN — HEPARIN SODIUM 18 UNITS/KG/HR: 10000 INJECTION, SOLUTION INTRAVENOUS at 21:05

## 2025-02-26 RX ADMIN — FAMOTIDINE 20 MG: 10 INJECTION, SOLUTION INTRAVENOUS at 08:04

## 2025-02-26 RX ADMIN — SODIUM CHLORIDE, POTASSIUM CHLORIDE, SODIUM LACTATE AND CALCIUM CHLORIDE: 600; 310; 30; 20 INJECTION, SOLUTION INTRAVENOUS at 10:40

## 2025-02-26 RX ADMIN — POTASSIUM CHLORIDE 40 MEQ: 1500 TABLET, EXTENDED RELEASE ORAL at 08:05

## 2025-02-26 RX ADMIN — SODIUM CHLORIDE: 9 INJECTION, SOLUTION INTRAVENOUS at 13:54

## 2025-02-26 ASSESSMENT — PAIN SCALES - GENERAL
PAINLEVEL_OUTOF10: 0
PAINLEVEL_OUTOF10: 0
PAINLEVEL_OUTOF10: 8

## 2025-02-26 ASSESSMENT — PAIN SCALES - WONG BAKER: WONGBAKER_NUMERICALRESPONSE: NO HURT

## 2025-02-26 ASSESSMENT — PAIN DESCRIPTION - LOCATION: LOCATION: LEG

## 2025-02-26 ASSESSMENT — PAIN DESCRIPTION - DESCRIPTORS
DESCRIPTORS: ACHING
DESCRIPTORS: OTHER (COMMENT)

## 2025-02-26 ASSESSMENT — PAIN DESCRIPTION - ORIENTATION: ORIENTATION: LEFT

## 2025-02-26 ASSESSMENT — PAIN - FUNCTIONAL ASSESSMENT
PAIN_FUNCTIONAL_ASSESSMENT: 0-10
PAIN_FUNCTIONAL_ASSESSMENT: 0-10

## 2025-02-26 NOTE — H&P
History and Physical / Pre-Sedation Assessment    Patient:  Anita Zamora   :   1945     Intended Procedure:  EGD    HPI: 79-year-old female with history of HTN, CKD, chronic back pain, GERD, arthritis, ovarian cancer status post salpingo-oophorectomy, radiation and chemotherapy () and recently diagnosed primary peritoneal carcinomatosis status post 2 cycles of chemotherapy admitted with abdominal pain and diarrhea. She also complains of new onset dysphagia.     Past Medical History:   Diagnosis Date    Acid reflux     on medication,but does not know name of it    Back pain     uses Lidocaine patches as needed    Cancer (HCC)     peritoneal carcinomatosis    Hypertension      Past Surgical History:   Procedure Laterality Date    CT BIOPSY LYMPH NODES SUPERFICIAL  2025    CT BIOPSY LYMPH NODES SUPERFICIAL 2025 [facility] CT SCAN       Medications reviewed  Prior to Admission medications    Medication Sig Start Date End Date Taking? Authorizing Provider   famotidine (PEPCID) 20 MG/2ML SOLN injection Infuse 2 mLs intravenously 25  Yes Provider, MD Luca   NIFEdipine (PROCARDIA XL) 30 MG extended release tablet  18  Yes Provider, MD Luca   lidocaine (LIDODERM) 5 %  4/3/18   Provider, MD Luca        Allergies: No Known Allergies    Nurses notes reviewed and agreed.    Physical Exam:  Vital Signs: BP (!) 148/82   Pulse 81   Temp 97.4 °F (36.3 °C) (Temporal)   Resp 16   Ht 1.575 m (5' 2\")   Wt 73.3 kg (161 lb 8 oz)   SpO2 95%   BMI 29.54 kg/m²    Airway: Mallampati: II (soft palate, uvula, fauces visible)  Pulmonary:Normal  Cardiac:Normal  Abdomen:Normal    Pre-Procedure Assessment / Plan:  ASA: Class 3 - A patient with severe systemic disease that limits activity but is not incapacitating  Level of Sedation Plan:Moderate sedation  Post Procedure plan: Return to same level of care    I assessed the patient and find that the patient is in satisfactory condition to

## 2025-02-26 NOTE — PROGRESS NOTES
Received call from Judith in vascular lab stating patient is positive for DVT in left leg. Further report to follow.   ANANDA Henderson NP made aware of result.

## 2025-02-26 NOTE — ANESTHESIA PRE PROCEDURE
Department of Anesthesiology  Preprocedure Note       Name:  Anita Zamora   Age:  79 y.o.  :  1945                                          MRN:  [id number]         Date:  2025      Surgeon: Surgeon(s):  Abner Gustafson MD    Procedure: Procedure(s):  EGD W/ANES.    Medications prior to admission:   Prior to Admission medications    Medication Sig Start Date End Date Taking? Authorizing Provider   famotidine (PEPCID) 20 MG/2ML SOLN injection Infuse 2 mLs intravenously 25  Yes ProviderLuca MD   NIFEdipine (PROCARDIA XL) 30 MG extended release tablet  18  Yes ProviderLuca MD   lidocaine (LIDODERM) 5 %  4/3/18   ProviderLuca MD       Current medications:    Current Facility-Administered Medications   Medication Dose Route Frequency Provider Last Rate Last Admin    diphenoxylate-atropine (LOMOTIL) 2.5-0.025 MG per tablet 1 tablet  1 tablet Oral 4x Daily PRN Dima Rocha MD        famotidine (PEPCID) injection 20 mg  20 mg IntraVENous Daily Dima Rocha MD   20 mg at 25 0804    lidocaine 4 % external patch 1 patch  1 patch Topical Daily Dima Rocha MD        NIFEdipine (PROCARDIA XL) extended release tablet 30 mg  30 mg Oral Daily Dima Rocha MD   30 mg at 25 0805    oxyCODONE (ROXICODONE) immediate release tablet 5 mg  5 mg Oral Q4H PRN Dima Rocha MD   5 mg at 25 0852    glucose chewable tablet 16 g  4 tablet Oral PRN Dima Rocha MD        dextrose bolus 10% 125 mL  125 mL IntraVENous PRN Dima Rocha MD        Or    dextrose bolus 10% 250 mL  250 mL IntraVENous PRN Dima Rocha MD        glucagon injection 1 mg  1 mg SubCUTAneous PRN Dima Rocha MD        dextrose 10 % infusion   IntraVENous Continuous PRN Dima Rocha MD        insulin lispro (HUMALOG,ADMELOG) injection vial 0-4 Units  0-4 Units SubCUTAneous 4x Daily AC & HS Dima Rocha MD

## 2025-02-26 NOTE — PROGRESS NOTES
Physical Therapy/Occupational Therapy  PT/OT on hold until pt has been on heparin gtt for 24 hours to address DVT.     Hadley Sánchez PT, DPT [id number]  Lilly cMmillan, OTR/L #[id number]

## 2025-02-26 NOTE — PROGRESS NOTES
2/25  Anti-Xa = 0.82 at 2308.  Decrease heparin gtt to 16 units/kg/hr.  Recheck Anti-Xa in 6 hours.  Rafiq Morales PharmD  2/25/2025 11:57 PM

## 2025-02-26 NOTE — PROGRESS NOTES
Patient returned from EGD.   Okay to resume heparin gtt per Dr. Gustafson.   Heparin gtt and IVF resumed at this time.   Call light in reach. Will monitor.

## 2025-02-26 NOTE — ANESTHESIA POSTPROCEDURE EVALUATION
Department of Anesthesiology  Postprocedure Note    Patient: nAita Zamora  MRN: [id number]  YOB: 1945  Date of evaluation: 2/26/2025    Procedure Summary       Date: 02/26/25 Room / Location: [address]    Anesthesia Start: 1040 Anesthesia Stop: 1108    Procedure: ESOPHAGOGASTRODUODENOSCOPY BIOPSY Diagnosis:       Esophageal dysphagia      (Esophageal dysphagia [R13.19])    Surgeons: Abner Gustafson MD Responsible Provider: Josette Santiago MD    Anesthesia Type: TIVA ASA Status: 3 - Emergent            Anesthesia Type: No value filed.    Aleksandra Phase I: Aleksandra Score: 10    Aleksandra Phase II: Aleksandra Score: 10    Anesthesia Post Evaluation    Comments: Postoperative Anesthesia Note    Name:    Anita Zamora  MRN:      [id number]    Patient Vitals in the past 12 hrs:  02/26/25 1130, BP:(!) 148/79, Temp:98.3 °F (36.8 °C), Temp src:Axillary, Pulse:81, Resp:15, SpO2:96 %  02/26/25 1106, BP:(!) 83/49, Temp:98.4 °F (36.9 °C), Temp src:Axillary, Pulse:81, Resp:13, SpO2:97 %  02/26/25 1104, Pulse:81  02/26/25 1001, BP:(!) 148/82, Temp:97.4 °F (36.3 °C), Temp src:Temporal, Pulse:81, Resp:16, SpO2:95 %  02/26/25 0741, BP:134/86, Temp:97 °F (36.1 °C), Temp src:Axillary, Pulse:83, Resp:17, SpO2:95 %  02/26/25 0356, BP:(!) 149/91, Temp:97 °F (36.1 °C), Temp src:Oral, Pulse:78, Resp:18, SpO2:95 %     LABS:    CBC  Lab Results       Component                Value               Date/Time                  WBC                      2.4 (L)             02/26/2025 04:53 AM        HGB                      8.6 (L)             02/26/2025 04:53 AM        HCT                      25.7 (L)            02/26/2025 04:53 AM        PLT                      87 (L)              02/26/2025 04:53 AM   RENAL  Lab Results       Component                Value               Date/Time                  NA                       139                 02/26/2025 04:54 AM        K

## 2025-02-26 NOTE — PROGRESS NOTES
(02/26/25 1156)    sodium chloride         PRN Meds:  diphenoxylate-atropine, oxyCODONE, glucose, dextrose bolus **OR** dextrose bolus, glucagon (rDNA), dextrose, morphine, heparin (porcine), heparin (porcine), sodium chloride flush, sodium chloride, potassium chloride **OR** potassium alternative oral replacement **OR** potassium chloride, magnesium sulfate, ondansetron **OR** ondansetron, polyethylene glycol      Data:  CBC:   Recent Labs     02/25/25  0710 02/25/25  1632 02/26/25  0453   WBC 2.8* 2.8* 2.4*   HGB 8.6* 9.0* 8.6*   HCT 25.5* 26.9* 25.7*   MCV 88.3 89.6 88.7   PLT 95* 97* 87*     BMP:   Recent Labs     02/24/25  1245 02/25/25  0710 02/26/25  0454    141 139   K 3.6 3.0* 3.1*    108 109   CO2 19* 15* 16*   BUN 46* 54* 59*   CREATININE 1.2 2.1* 2.0*     LIVER PROFILE:   Recent Labs     02/24/25  1245   AST 26   ALT 16   BILITOT 0.5   ALKPHOS 73     PT/INR:   Recent Labs     02/25/25  1632 02/26/25  0454   PROTIME 16.8* 16.3*   INR 1.35* 1.29*       CULTURES    Results       Procedure Component Value Units Date/Time    GI Bacterial Pathogens By PCR [[id number]]     Order Status: Sent Specimen: Stool     Culture, Blood 2 [[id number]] Collected: 02/24/25 1524    Order Status: Completed Specimen: Blood Updated: 02/25/25 1815     Culture, Blood 2 No Growth to date.  Any change in status will be called.    Narrative:      ORDER#: [id number]                          ORDERED BY: SILVIO VELAZQUEZ  SOURCE: Blood Antecubital-Rig              COLLECTED:  02/24/25 15:24  ANTIBIOTICS AT GENIA.:                      RECEIVED :  02/24/25 15:30  If child <=2 yrs old please draw pediatric bottle.~Blood Culture #2    Culture, Blood 1 [[id number]] Collected: 02/24/25 1521    Order Status: Completed Specimen: Blood Updated: 02/25/25 1815     Blood Culture, Routine No Growth to date.  Any change in status will be called.    Narrative:      ORDER#: [id number]                          ORDERED BY: MARCUS

## 2025-02-26 NOTE — PROGRESS NOTES
2/26  Anti-Xa = 0.59 at 0454.  Continue heparin gtt at 16 units/kg/hr.  Recheck Anti-Xa in 6 hours.  Rafiq Morales PharmD  2/26/2025 5:47 AM

## 2025-02-26 NOTE — PROGRESS NOTES
Bedside report and transfer of care given to SANDRA Santiago. Pt currently resting in bed with the call light within reach. Pt denies any other care needs at this time. Pt stable at this time.

## 2025-02-26 NOTE — PROGRESS NOTES
High dose Heparin GTT:  Anti-Xa at 1800 check is 0.25IU/mL.  Desired range is 0.3-0.7IU/mL.  Will forego the half bolus but increase the infusion to 18units/kg/hr or 13.2mL/hr and recheck labs at 0100 2/27/25  Abelardo Ramos RPH PharmD 2/26/2025 6:43 PM

## 2025-02-26 NOTE — FLOWSHEET NOTE
02/25/25 1946   Vital Signs   Temp (!) 96.5 °F (35.8 °C)   Temp Source Oral   Pulse 82   Respirations 20   /75   MAP (Calculated) 89   BP Location Right upper arm   BP Method Automatic   Patient Position Semi fowlers   Oxygen Therapy   SpO2 93 %   O2 Device None (Room air)   Rhythm Interpretation   Cardiac Rhythm Sinus rhythm        02/25/25 1946   Vital Signs   Temp (!) 96.5 °F (35.8 °C)   Temp Source Oral   Pulse 82   Respirations 20   /75   MAP (Calculated) 89   BP Location Right upper arm   BP Method Automatic   Patient Position Semi fowlers   Oxygen Therapy   SpO2 93 %   O2 Device None (Room air)   Rhythm Interpretation   Cardiac Rhythm Sinus rhythm     PM Assessment completed. Scheduled medications given per MAR, On Room Air, A&O X4, Vital Signs completed and Charted, Patient denies any further needs at this time. Call light within reach, Reminded patient to call RN if she needs anything.

## 2025-02-26 NOTE — PROGRESS NOTES
cardiomegaly.    Organs: Mild thickening along the hepatic capsule similar to prior  examination.  Gallbladder is surgically absent.  Spleen, pancreas, adrenal  glands kidneys are unremarkable.    GI/Bowel: Mildly dilated loops of small bowel and colon with air-fluid levels  present.  No significant wall thickening.  Small bowel loops measure up to  4.3 cm.  Stomach is unremarkable.    Pelvis: Bladder is unremarkable.  Uterus is atrophic or surgically absent.    Peritoneum/Retroperitoneum: No free air or adenopathy.  Calcified plaque in  the abdominal aorta.  Mesenteric implants do not appear significantly changed  compared to prior examination.    Bones/Soft Tissues: Moderate to severe multilevel degenerative changes of the  spine similar to prior examination.  No acute osseous abnormality or osseous  lesion.  Edema noted within the subcutaneous soft tissues of the left thigh.  No focal fluid collection or mass identified.  Impression: Edema in the left thigh with no focal fluid collection or mass identified.    Distended loops of small bowel and colon with air-fluid levels noted likely  representing diarrheal disease or ileus.  No significant wall thickening to  suggest enterocolitis and no definite evidence of obstruction.    No significant change in perihepatic and mesenteric implants consistent with  peritoneal carcinomatosis.    Left colonic diverticulosis.      Problem List  Patient Active Problem List   Diagnosis    Primary osteoarthritis of right knee    Leukopenia    Dehydration    General weakness    Leg edema, left    HUMBERTO (acute kidney injury)    Dysphagia    Diarrhea    Hypokalemia    Hypomagnesemia       ASSESSMENT AND PLAN:    Primary peritoneal cancer with carcinomatosis     - s/p C2 chemotherapy last week  - given at 50% dose due to poor tolerance and hospitalization following C1  - still intolerant and admitted with dehydration, diarrhea and FTT  - do not think she can tolerate additional  chemotherapy and not motivated to continue  - palliative care consulted, appropriate for Hospice  - plans to meet with Hospice     2. Neutropenia     - from chemotherapy   - WBC slightly better this AM  - if dropping, can add granix  - ANC still > 1000  - on empiric abx     3. Diarrhea/dehydration/HUMBERTO     - lomotil and IVF for now  - GI consulted     4. LE edema     - Doppler with DVT  - on heparin  - can switch to eliquis at discharge          ONCOLOGIC DISPOSITION: TBD      Jose C Valiente MD  Please contact through [employer]

## 2025-02-26 NOTE — FLOWSHEET NOTE
02/26/25 0741   Vital Signs   Temp 97 °F (36.1 °C)   Temp Source Axillary   Pulse 83   Heart Rate Source Monitor   Respirations 17   /86   MAP (Calculated) 102   BP Location Right upper arm   BP Method Automatic   Patient Position Lying left side   Oxygen Therapy   SpO2 95 %   O2 Device None (Room air)     Patient resting quietly in bed. No s/s of distress noted. Shift assessment complete, see flow sheet. Denies needs at this time. Call light in reach. Will monitor.

## 2025-02-26 NOTE — PROGRESS NOTES
Received call from ultrasound tech regarding plan for paracentesis tomorrow. Per tech, plan if for para at 1100.   Heparin gtt can be turned off upon transport off unit.

## 2025-02-27 ENCOUNTER — APPOINTMENT (OUTPATIENT)
Dept: ULTRASOUND IMAGING | Age: 80
End: 2025-02-27
Payer: MEDICARE

## 2025-02-27 PROBLEM — C48.2 MALIGNANT NEOPLASM OF PERITONEUM, UNSPECIFIED (HCC): Status: ACTIVE | Noted: 2025-02-27

## 2025-02-27 PROBLEM — I82.4Y2 ACUTE DEEP VEIN THROMBOSIS (DVT) OF PROXIMAL VEIN OF LEFT LOWER EXTREMITY (HCC): Status: ACTIVE | Noted: 2025-02-27

## 2025-02-27 LAB
ANION GAP SERPL CALCULATED.3IONS-SCNC: 11 MMOL/L (ref 3–16)
ANTI-XA UNFRAC HEPARIN: 0.22 IU/ML (ref 0.3–0.7)
ANTI-XA UNFRAC HEPARIN: 0.45 IU/ML (ref 0.3–0.7)
ANTI-XA UNFRAC HEPARIN: 0.51 IU/ML (ref 0.3–0.7)
BASOPHILS # BLD: 0 K/UL (ref 0–0.2)
BASOPHILS NFR BLD: 0.1 %
BUN SERPL-MCNC: 48 MG/DL (ref 7–20)
C DIFF TOX A+B STL QL IA: NORMAL
CALCIUM SERPL-MCNC: 6.8 MG/DL (ref 8.3–10.6)
CHLORIDE SERPL-SCNC: 112 MMOL/L (ref 99–110)
CO2 SERPL-SCNC: 16 MMOL/L (ref 21–32)
CREAT SERPL-MCNC: 1.1 MG/DL (ref 0.6–1.2)
DEPRECATED RDW RBC AUTO: 15.4 % (ref 12.4–15.4)
EOSINOPHIL # BLD: 0.1 K/UL (ref 0–0.6)
EOSINOPHIL NFR BLD: 3.7 %
GFR SERPLBLD CREATININE-BSD FMLA CKD-EPI: 50 ML/MIN/{1.73_M2}
GI PATHOGENS PNL STL NAA+PROBE: NORMAL
GLUCOSE BLD-MCNC: 100 MG/DL (ref 70–99)
GLUCOSE BLD-MCNC: 111 MG/DL (ref 70–99)
GLUCOSE BLD-MCNC: 117 MG/DL (ref 70–99)
GLUCOSE BLD-MCNC: 123 MG/DL (ref 70–99)
GLUCOSE SERPL-MCNC: 102 MG/DL (ref 70–99)
HCT VFR BLD AUTO: 23.4 % (ref 36–48)
HGB BLD-MCNC: 7.9 G/DL (ref 12–16)
LYMPHOCYTES # BLD: 0.5 K/UL (ref 1–5.1)
LYMPHOCYTES NFR BLD: 14.8 %
MAGNESIUM SERPL-MCNC: 1.73 MG/DL (ref 1.8–2.4)
MCH RBC QN AUTO: 30.1 PG (ref 26–34)
MCHC RBC AUTO-ENTMCNC: 34 G/DL (ref 31–36)
MCV RBC AUTO: 88.5 FL (ref 80–100)
MONOCYTES # BLD: 0.1 K/UL (ref 0–1.3)
MONOCYTES NFR BLD: 3.3 %
NEUTROPHILS # BLD: 2.6 K/UL (ref 1.7–7.7)
NEUTROPHILS NFR BLD: 78.1 %
PERFORMED ON: ABNORMAL
PLATELET # BLD AUTO: 98 K/UL (ref 135–450)
PMV BLD AUTO: 8.5 FL (ref 5–10.5)
POTASSIUM SERPL-SCNC: 3.3 MMOL/L (ref 3.5–5.1)
RBC # BLD AUTO: 2.64 M/UL (ref 4–5.2)
SODIUM SERPL-SCNC: 139 MMOL/L (ref 136–145)
WBC # BLD AUTO: 3.3 K/UL (ref 4–11)

## 2025-02-27 PROCEDURE — 6370000000 HC RX 637 (ALT 250 FOR IP)

## 2025-02-27 PROCEDURE — 99233 SBSQ HOSP IP/OBS HIGH 50: CPT | Performed by: INTERNAL MEDICINE

## 2025-02-27 PROCEDURE — 36415 COLL VENOUS BLD VENIPUNCTURE: CPT

## 2025-02-27 PROCEDURE — 2580000003 HC RX 258: Performed by: PEDIATRICS

## 2025-02-27 PROCEDURE — 85025 COMPLETE CBC W/AUTO DIFF WBC: CPT

## 2025-02-27 PROCEDURE — 6360000002 HC RX W HCPCS

## 2025-02-27 PROCEDURE — 80048 BASIC METABOLIC PNL TOTAL CA: CPT

## 2025-02-27 PROCEDURE — 6370000000 HC RX 637 (ALT 250 FOR IP): Performed by: PEDIATRICS

## 2025-02-27 PROCEDURE — 85520 HEPARIN ASSAY: CPT

## 2025-02-27 PROCEDURE — 2060000000 HC ICU INTERMEDIATE R&B

## 2025-02-27 PROCEDURE — 87324 CLOSTRIDIUM AG IA: CPT

## 2025-02-27 PROCEDURE — 2580000003 HC RX 258: Performed by: NURSE PRACTITIONER

## 2025-02-27 PROCEDURE — 2500000003 HC RX 250 WO HCPCS

## 2025-02-27 PROCEDURE — 76705 ECHO EXAM OF ABDOMEN: CPT

## 2025-02-27 PROCEDURE — 2580000003 HC RX 258

## 2025-02-27 PROCEDURE — 87449 NOS EACH ORGANISM AG IA: CPT

## 2025-02-27 PROCEDURE — 6360000002 HC RX W HCPCS: Performed by: PEDIATRICS

## 2025-02-27 PROCEDURE — 87506 IADNA-DNA/RNA PROBE TQ 6-11: CPT

## 2025-02-27 PROCEDURE — 82653 EL-1 FECAL QUANTITATIVE: CPT

## 2025-02-27 PROCEDURE — 83735 ASSAY OF MAGNESIUM: CPT

## 2025-02-27 RX ORDER — PANTOPRAZOLE SODIUM 40 MG/1
40 TABLET, DELAYED RELEASE ORAL EVERY 12 HOURS
Status: DISCONTINUED | OUTPATIENT
Start: 2025-02-27 | End: 2025-02-28 | Stop reason: HOSPADM

## 2025-02-27 RX ADMIN — OXYCODONE 5 MG: 5 TABLET ORAL at 16:05

## 2025-02-27 RX ADMIN — CEFTRIAXONE SODIUM 2000 MG: 2 INJECTION, POWDER, FOR SOLUTION INTRAMUSCULAR; INTRAVENOUS at 08:46

## 2025-02-27 RX ADMIN — NIFEDIPINE 30 MG: 30 TABLET, FILM COATED, EXTENDED RELEASE ORAL at 08:42

## 2025-02-27 RX ADMIN — SODIUM CHLORIDE: 9 INJECTION, SOLUTION INTRAVENOUS at 00:05

## 2025-02-27 RX ADMIN — PANTOPRAZOLE SODIUM 40 MG: 40 INJECTION, POWDER, LYOPHILIZED, FOR SOLUTION INTRAVENOUS at 04:00

## 2025-02-27 RX ADMIN — PANTOPRAZOLE SODIUM 40 MG: 40 TABLET, DELAYED RELEASE ORAL at 16:05

## 2025-02-27 RX ADMIN — SODIUM CHLORIDE, PRESERVATIVE FREE 5 ML: 5 INJECTION INTRAVENOUS at 08:49

## 2025-02-27 ASSESSMENT — PAIN DESCRIPTION - ORIENTATION: ORIENTATION: LEFT;LOWER

## 2025-02-27 ASSESSMENT — PAIN SCALES - GENERAL: PAINLEVEL_OUTOF10: 7

## 2025-02-27 ASSESSMENT — PAIN DESCRIPTION - LOCATION: LOCATION: LEG

## 2025-02-27 ASSESSMENT — PAIN DESCRIPTION - DESCRIPTORS: DESCRIPTORS: ACHING

## 2025-02-27 NOTE — PROGRESS NOTES
2/27/2025  Current drip rate = 20 units/kg//hr  Anti-Xa = 0.51 at 0902.  Per protocol continue heparin gtt at 20 units/kg/hr.  Recheck Anti-Xa in 6 hours.    Eric Blizzard, RPH 2/27/2025 9:39 AM

## 2025-02-27 NOTE — PROGRESS NOTES
Physical Therapy/Occupational Therapy  Orders received and chart reviewed. RN request hold on PT/OT this date. Will re-attempt as pt's status allows and therapy schedule permits.    Hadley Sánchez PT, DPT [id number]  Lilly Mcmillan, OTR/L #[id number]

## 2025-02-27 NOTE — FLOWSHEET NOTE
02/27/25 0743   Vital Signs   Pulse 81   Heart Rate Source Monitor   Respirations 16   BP (!) 152/79   MAP (Calculated) 103   BP Location Right upper arm   BP Method Automatic   Patient Position Semi fowlers   Oxygen Therapy   SpO2 93 %   O2 Device None (Room air)     Shift assessment completed. See flow sheet. Medications given. Vitals are stable. Patient is awake and alert and denies further needs. Call light within reach.

## 2025-02-27 NOTE — CONSULTS
Palliative Care Chart Review  and Check in Note:     NAME:  Anita Zamora  Admit Date: 2/24/2025  Hospital Day:  Hospital Day: 4   Current Code status: Limited    Palliative care is continuing to following Ms. Zamora for symptom management,  and goals of care discussion as needed. Patient's chart reviewed today 2/27/25.        The following are the currently established goals/code status, and Symptom management.     Goals of care: Met with pt and pt's daughter at bedside to continue with goals of care discussion. Per  note pt appropriate for hospice and he does not think pt can tolerate additional chemotherapy and pt not motivated to continue.     Per pt she is still considering home with hospice vrs SNF for rehab. Pt's daughter,Nayeli, states she is trying to convince the pt to come to [place] and live with her and her family. Awaiting PT notes when appropriate. Pt is on heparin drip at this time. SNF list provided to Nayeli and she requests for Mary Rodriguez from [facility] and [facility] to stop in and talk with them this AM.    KEVIN Leroy aware of above conversation.    Code status: Limited x four no answers    Discharge plan: to be decided.      Lise Corral RN  02/27/25  10:28 AM

## 2025-02-27 NOTE — PROGRESS NOTES
Physician Progress Note      PATIENT:               LEIDA BUSTOS  CSN #:                  [id number]  :                       1945  ADMIT DATE:       2025 11:34 AM  DISCH DATE:  RESPONDING  PROVIDER #:        CALVIN Hughes CNP          QUERY TEXT:    Patient admitted with \"Peritoneal carcinomatosis with nausea, uncontrolled   diarrhea, dehydration, elevated lactic acid\",  and has documented   pancytopenia.  If possible, please document in progress notes and discharge   summary further specificity regarding pancytopenia:    The medical record reflects the following:  Risk Factors:Primary peritoneal cancer with carcinomatosis - s/p C2   chemotherapy last week  Clinical Indicators: WBC 1.9, H&H 8.6/25, Platelets 95- Oncology- Neutropenia-   from chemotherapy  Treatment: Oncology and GI consult,  on heparin for DVT.palliative care   consulted, appropriate for Hospice, serial labs, WBC-if dropping, can add   granix- ANC still > 1000- on empiric abx    Thank-You, Selin Lewis RN, BSN, CCDS  Options provided:  -- Antineoplastic (chemotherapy) induced pancytopenia  -- Other - I will add my own diagnosis  -- Disagree - Not applicable / Not valid  -- Disagree - Clinically unable to determine / Unknown  -- Refer to Clinical Documentation Reviewer    PROVIDER RESPONSE TEXT:    Patient has antineoplastic (chemotherapy) induced pancytopenia.    Query created by: Chanda Lewis on 2025 9:17 AM      Electronically signed by:  CALVIN Hughes CNP 2025 12:12 PM

## 2025-02-27 NOTE — PLAN OF CARE
Problem: Pain  Goal: Verbalizes/displays adequate comfort level or baseline comfort level  2/27/2025 1527 by Michael Renae RN  Outcome: Progressing  2/27/2025 0133 by Ashlyn Leon RN  Outcome: Progressing     Problem: Discharge Planning  Goal: Discharge to home or other facility with appropriate resources  2/27/2025 1527 by Michael Renae RN  Outcome: Progressing  2/27/2025 0133 by Ashlyn Leon RN  Outcome: Progressing     Problem: Safety - Adult  Goal: Free from fall injury  2/27/2025 1527 by Michael Renae RN  Outcome: Progressing  2/27/2025 0133 by Ashlyn Leon RN  Outcome: Progressing

## 2025-02-27 NOTE — FLOWSHEET NOTE
02/26/25 2034   Vital Signs   Temp 97.8 °F (36.6 °C)   Temp Source Oral   Pulse 86   Heart Rate Source Monitor   Respirations 16   /80   MAP (Calculated) 98   BP Location Right upper arm   BP Method Automatic   Patient Position Semi fowlers   Oxygen Therapy   SpO2 95 %   O2 Device None (Room air)     PM Assessment completed. Scheduled medications given per MAR, On Room Air, A&O X4 but confused at times, Vital Signs completed and Charted, Patient denies any further needs at this time. Call light within reach, Reminded patient to call RN if she needs anything.

## 2025-02-27 NOTE — CARE COORDINATION
[place] met with pt and family yesterday. Pt is not ready for hospice at this time. Will follow for potential needs.

## 2025-02-27 NOTE — PROGRESS NOTES
Comprehensive Nutrition Assessment    Type and Reason for Visit:  Initial, Positive nutrition screen, Consult (+ screen for MST = 2; consult for ONS)    Nutrition Recommendations/Plan:   Modified diet order to ADULT DIET; Regular; Low Microbial diet order.   Continue Ensure Max with meals.   Monitor appetite, meal intake, and acceptance/intake of ONS.   Please obtain an updated weight for this patient - last weight was obtained on 2/25/25.   Monitor nutrition-related labs, bowel function, and weight trends.      Malnutrition Assessment:  Malnutrition Status:  At risk for malnutrition (02/27/25 1346)    Context:  Acute Illness     Findings of the 6 clinical characteristics of malnutrition:  Energy Intake:  50% or less of estimated energy requirements for 5 or more days  Weight Loss:  No weight loss     Body Fat Loss:  Unable to assess (not available)     Muscle Mass Loss:  Unable to assess (not available)    Fluid Accumulation:  No fluid accumulation     Strength:  Not Performed    Nutrition Assessment:    patient is nutritionally compromised - inadequate oral intake r/t inadequate protein-energy intake, increased demand for energy/nutrients, catabolic illness, and decreased appetite AEB poor po intake PTA, < 50% of meals consumed during admission, diarrhea, and patient reported barriers; she is at risk for further compromise d/t ongoing poor appetite and po intake + patient is not tolerating chemotherapy well; will modify diet order to ADULT DIET; Regular; Low Microbial diet order + continue Ensure Max with meals    Nutrition Related Findings:    patient is A & O; patient presented with c/o increased weakness and fatigue + poor po intake and diarrhea PTA; per patient's family, patient has had poor po intake and diarrhea x ~3 months PTA; patient has peritoneal carcinoma and she received her second dose of chemo recently but she does not seem to be tolerating it well; patient is from home and her daughter has been  staying with her for the past couple of months to help care for patient; patient's daughter and daughter's family live in [place] and daughter wants patient to go live in [place] with them; patient is not ready for hospice at this time; she is consuming < 50% of documented meals in flow sheets; + BM on 2/27/25; patient had an EGD on 2/26/25 and PUD present Wound Type: None       Current Nutrition Intake & Therapies:    Average Meal Intake: 1-25%, 26-50%  Average Supplements Intake: Unable to assess (no ONS intake data documented in flow sheets)  ADULT ORAL NUTRITION SUPPLEMENT; Breakfast, Dinner, Lunch; Low Calorie/High Protein Oral Supplement  ADULT DIET; Regular; Low Microbial    Anthropometric Measures:  Height: 157.5 cm (5' 2.01\")  Ideal Body Weight (IBW): 110 lbs (50 kg)    Admission Body Weight: 73.3 kg (161 lb 8 oz) (obtained on 2/25/25; actual weight)  Current Body Weight: 73.3 kg (161 lb 8 oz) (obtained on 2/25/25; actual weight), 146.8 % IBW. Weight Source: Bed scale  Current BMI (kg/m2): 29.5  Usual Body Weight: 74.6 kg (164 lb 8.1 oz) (obtained on 11/4/24; actual weight)     % Weight Change (Calculated): -1.8  Weight Adjustment For: No Adjustment                 BMI Categories: Overweight (BMI 25.0-29.9)    Estimated Daily Nutrient Needs:  Energy Requirements Based On: Kcal/kg  Weight Used for Energy Requirements: Current  Energy (kcal/day): 1465 - 1685 kcals based on 20-23 kcals/kg/CBW  Weight Used for Protein Requirements: Current  Protein (g/day): 88 - 103 g protein based on 1.2-1.4 g/kg/CBW  Method Used for Fluid Requirements: 1 ml/kcal  Fluid (ml/day): 1465 - 1685 ml    Nutrition Diagnosis:   Inadequate oral intake related to inadequate protein-energy intake, catabolic illness, increase demand for energy/nutrients, decreased appetite, nausea/vomiting/diarrhea as evidenced by poor intake prior to admission, intake 0-25%, intake 26-50%, lab values, GI abnormality, nausea, vomiting, diarrhea,

## 2025-02-27 NOTE — PROGRESS NOTES
Clostridium difficile toxin/antigen [[id number]] Collected: 02/27/25 0417    Order Status: Completed Specimen: Stool Updated: 02/27/25 1218     C.diff Toxin/Antigen --     Negative for Clostridium difficile antigen and toxin  Normal Range: Negative      Narrative:      ORDER#: [id number]                          ORDERED BY: LENNOX FELDMAN  SOURCE: Stool                              COLLECTED:  02/27/25 04:17  ANTIBIOTICS AT GENIA.:                      RECEIVED :  02/27/25 11:22  Collect White vial (sterile container)    Culture, Blood 2 [[id number]] Collected: 02/24/25 1524    Order Status: Completed Specimen: Blood Updated: 02/25/25 1815     Culture, Blood 2 No Growth to date.  Any change in status will be called.    Narrative:      ORDER#: [id number]                          ORDERED BY: SILVIO VELAZQUEZ  SOURCE: Blood Antecubital-Rig              COLLECTED:  02/24/25 15:24  ANTIBIOTICS AT GENIA.:                      RECEIVED :  02/24/25 15:30  If child <=2 yrs old please draw pediatric bottle.~Blood Culture #2    Culture, Blood 1 [[id number]] Collected: 02/24/25 1521    Order Status: Completed Specimen: Blood Updated: 02/25/25 1815     Blood Culture, Routine No Growth to date.  Any change in status will be called.    Narrative:      ORDER#: [id number]                          ORDERED BY: SILVIO VELAZQUEZ  SOURCE: Blood Hand, Left                   COLLECTED:  02/24/25 15:21  ANTIBIOTICS AT GENIA.:                      RECEIVED :  02/24/25 17:51  If child <=2 yrs old please draw pediatric bottle.~Blood Culture 1    Clostridium difficile toxin/antigen [[id number]] Collected: 02/24/25 1509    Order Status: Completed Specimen: Stool Updated: 02/24/25 1622     C.diff Toxin/Antigen --     Negative for Clostridium difficile antigen and toxin  Normal Range: Negative      Narrative:      ORDER#: [id number]                          ORDERED BY: SILVIO VELAZQUEZ  SOURCE: Stool                              COLLECTED:  02/24/25  15:09  ANTIBIOTICS AT GENIA.:                      RECEIVED :  02/24/25 15:11  Collect White vial (sterile container)    COVID-19 & Influenza Combo [[id number]] Collected: 02/24/25 1245    Order Status: Completed Specimen: Nasopharyngeal Swab Updated: 02/24/25 1310     SARS-CoV-2 RNA, RT PCR NOT DETECTED     Comment: Not Detected results do not preclude SARS-CoV-2 infection and  should not be used as the sole basis for patient management  decisions.  Results must be combined with clinical observations,  patient history, and epidemiological information.  Testing was performed using AN[employer]PH[employer]IA SARS-CoV-2, Influenza A/B  and Respiratory Syncytial Virus nucleic acid assay. This  test is a multiplex Real-Time Reverse Transcriptase Polymerase Chain  Reaction (RT-PCR)-based in vitro diagnostic test intended for the  qualitative detection of nucleic acids from SARS-CoV-2,  influenza A,influenza B and Respiratory Syncytial Virus  in nasopharyngeal and nasal swab specimens.    Patient Fact Sheet:  https://www.fda.gov/media/169097/download  Provider Fact Sheet: https://www.fda.gov/media/324841/download  EUA: https://www.fda.gov/media/517061/download  IFU: https://www.fda.gov/media/091719/download    Methodology:  RT-PCR          Influenza A NOT DETECTED     Influenza B NOT DETECTED            RADIOLOGY    US ABDOMEN LIMITED   Final Result   Negative for ascites.  No paracentesis was performed.         Vascular duplex lower extremity venous left   Final Result      CT ABDOMEN PELVIS WO CONTRAST Additional Contrast? None   Final Result   Edema in the left thigh with no focal fluid collection or mass identified.      Distended loops of small bowel and colon with air-fluid levels noted likely   representing diarrheal disease or ileus.  No significant wall thickening to   suggest enterocolitis and no definite evidence of obstruction.      No significant change in perihepatic and mesenteric implants consistent with   peritoneal

## 2025-02-27 NOTE — PROGRESS NOTES
Pharmacy - Heparin  Anti-Xa level drawn at 1608 today = 0.45 IU/ml (Goal anti-Xa 0.3-0.7 IU/mL).  Current heparin drip rate = 20 units/kg/hr.  Per protocol, continue heparin drip at current rate = 20 units/kg/hr.  Draw Anti-Xa level tomorrow morning with am labs since there have been two consecutive therapeutic anti-Xa levels. Will adjust to goal anti-Xa 0.3-0.7 IU/mL. Pharmacy will continue to monitor and adjust as necessary.

## 2025-02-27 NOTE — PROGRESS NOTES
ONCOLOGY HEMATOLOGY CARE PROGRESS NOTE      SUBJECTIVE:    Remains admitted. Afebrile. Now on heparin for DVT. Had EGD yesterday.     ROS:     Constitutional:  No weight loss, No fever, No chills, No night sweats.  Energy level good.  Eyes:  No impairment or change in vision  ENT / Mouth:  No pain, abnormal ulceration, bleeding, nasal drip or change in voice or hearing  Cardiovascular:  No chest pain, palpitations, new edema, or calf discomfort  Respiratory:  No pain, hemoptysis, change to breathing  Breast:  No pain, discharge, change in appearance or texture  Gastrointestinal:  No pain, cramping, jaundice, change to eating and bowel habits  Urinary:  No pain, bleeding or change in continence  Genitalia: No pain, bleeding or discharge  Musculoskeletal:  No redness, pain, edema or weakness  Skin:  No pruritus, rash, change to nodules or lesions  Neurologic:  No discomfort, change in mental status, speech, sensory or motor activity  Psychiatric:  No change in concentration or change to affect or mood  Endocrine:  No hot flashes, increased thirst, or change to urine production  Hematologic: No petechiae, ecchymosis or bleeding  Lymphatic:  No lymphadenopathy or lymphedema  Allergy / Immunologic:  No eczema, hives, frequent or recurrent infections    OBJECTIVE        Physical    VITALS:  Patient Vitals for the past 24 hrs:   BP Temp Temp src Pulse Resp SpO2   02/27/25 0402 128/69 97.1 °F (36.2 °C) Oral 80 16 90 %   02/27/25 0002 114/70 97.7 °F (36.5 °C) Oral 80 16 92 %   02/26/25 2247 -- -- -- -- 16 --   02/26/25 2034 135/80 97.8 °F (36.6 °C) Oral 86 16 95 %   02/26/25 1634 (!) 144/88 97.1 °F (36.2 °C) Oral 85 16 95 %   02/26/25 1130 (!) 148/79 98.3 °F (36.8 °C) Axillary 81 15 96 %   02/26/25 1106 (!) 83/49 98.4 °F (36.9 °C) Axillary 81 13 97 %   02/26/25 1104 -- -- -- 81 -- --   02/26/25 1001 (!) 148/82 97.4 °F (36.3 °C) Temporal 81 16 95 %   02/26/25 0741 134/86 97 °F (36.1 °C)  Axillary 83 17 95 %       24HR INTAKE/OUTPUT:    Intake/Output Summary (Last 24 hours) at 2/27/2025 0726  Last data filed at 2/27/2025 0402  Gross per 24 hour   Intake 200 ml   Output 550 ml   Net -350 ml       CONSTITUTIONAL: awake, alert, cooperative, no apparent distress   EYES: pupils equal, round and reactive to light, sclera clear and conjunctiva normal  ENT: Normocephalic, without obvious abnormality, atraumatic  NECK: supple, symmetrical, no jugular venous distension and no carotid bruits   HEMATOLOGIC/LYMPHATIC: no cervical, supraclavicular or axillary lymphadenopathy   LUNGS: no increased work of breathing and clear to auscultation   CARDIOVASCULAR: regular rate and rhythm, normal S1 and S2, no murmur noted  ABDOMEN: normal bowel sounds x 4, soft, non-distended, non-tender, no masses palpated, no hepatosplenomgaly   MUSCULOSKELETAL: full range of motion noted, tone is normal  NEUROLOGIC: awake, alert, oriented to name, place and time. Motor skills grossly intact.   SKIN: Normal skin color, texture, turgor and no jaundice. appears intact   EXTREMITIES: no LE edema     DATA:  CBC:    Recent Labs     02/27/25  0543 02/26/25  0453 02/25/25  1632 02/25/25  0710 02/24/25  1244   WBC 3.3* 2.4* 2.8* 2.8* 1.9*   NEUTROABS 2.6 1.8  --  2.1 1.0*   LYMPHOPCT 14.8 24.7  --  23.8 40.0   RBC 2.64* 2.89* 3.00* 2.89* 3.41*   HGB 7.9* 8.6* 9.0* 8.6* 10.2*   HCT 23.4* 25.7* 26.9* 25.5* 30.1*   MCV 88.5 88.7 89.6 88.3 88.1   MCH 30.1 29.9 29.8 29.8 30.0   MCHC 34.0 33.7 33.3 33.8 34.0   RDW 15.4 15.4 15.7* 15.5* 15.2   PLT 98* 87* 97* 95* 220       PT/INR:    Recent Labs     02/26/25  0454 02/25/25  1632   INR 1.29* 1.35*     PTT:    Recent Labs     02/25/25  1632   APTT 33.4       CMP:    Recent Labs     02/27/25  0543 02/26/25  0454 02/25/25  0710 02/24/25  1245    139 141 138   K 3.3* 3.1* 3.0* 3.6   * 109 108 102   CO2 16* 16* 15* 19*   GLUCOSE 102* 111* 134* 195*   BUN 48* 59* 54* 46*   CREATININE 1.1 2.0*

## 2025-02-27 NOTE — PROGRESS NOTES
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  Today, she is alert. She denies difficulty swallowing. She denies pain. She reports last bowel movement was last night. She reports poor appetite, but is drinking Ensure.     Exam:   Vitals:    02/27/25 0743   BP: (!) 152/79   Pulse: 81   Resp: 16   Temp:    SpO2: 93%   Generalized: alert, no acute distress  HEENT: sclera clear, anicteric  Neck: supple, trachea midline  Heart: NSR  Abdomen: soft, NT, ND  Extremities: LLE > RLE    Medications: Reviewed    Labs:  CBC:   Recent Labs     02/25/25  1632 02/26/25  0453 02/27/25  0543   WBC 2.8* 2.4* 3.3*   HGB 9.0* 8.6* 7.9*   HCT 26.9* 25.7* 23.4*   MCV 89.6 88.7 88.5   PLT 97* 87* 98*     BMP:   Recent Labs     02/25/25  0710 02/26/25  0454 02/27/25  0543    139 139   K 3.0* 3.1* 3.3*    109 112*   CO2 15* 16* 16*   BUN 54* 59* 48*   CREATININE 2.1* 2.0* 1.1     Assessment  80 yo female with pmx of peritoneal carcinomatosis on chemotherapy and HTN admitted with weakness and dysphagia. EGD with several superficial esophageal ulcers, hiatal hernia, and non bleeding superficial gastric ulcer.      Plan  Continue supportive care  Await pathology   PPI PO BID  Stool tests pending  Plan for paracentesis today   Regular diet  Ensure supplements TID  PT/OT  Oncology and Palliative care following    Melanie Santoyo, FAY - CNP  9:40 AM 2/27/2025

## 2025-02-27 NOTE — PROGRESS NOTES
2/27  Anti-Xa - 0.22 at 0107.  Give heparin bolus of 2900 units and increase heparin gtt to 20 units/kg/hr.  Recheck Anti-Xa in 6 hours.  Rafiq Morales, PharmD  2/27/2025 2:38 AM

## 2025-02-27 NOTE — PROGRESS NOTES
Physician Progress Note      PATIENT:               LEIDA BUSTOS  [facility] #:                  [id number]  :                       1945  ADMIT DATE:       2025 11:34 AM  DISCH DATE:  RESPONDING  PROVIDER #:        Irving Kaye MD          QUERY TEXT:    Patient admitted with \"Peritoneal carcinomatosis with nausea, uncontrolled   diarrhea, dehydration, elevated lactic acid: Dysphagia  Pancytopenia\",and noted to have \"SIRS in neutropenic patient\". If possible,   please document in progress notes and discharge summary if you are evaluating   and/or treating any of the following:    The medical record reflects the following:  Risk Factors: Peritoneal carcinomatosis with nausea, uncontrolled diarrhea,   dehydration, elevated lactic acid  Clinical Indicators: LA 4.2, 4.6 on admission -Creatinine 2.1. WBC-1.9- PN-   \"SIRS in neutropenic patient:  - presenting ANC of 1000 (moderate neutropenia), temp 96.5, , BP 73/50\"  Treatment: blood cultures pending - ceftriaxone IV as precaution - UA pending   (if patient able to provide sample) - neutropenic isolation - telemetry - hold   any anti-pyretics so as not to suppress any fevers (opiates prn pain) s/p   1000 ml NS support in ED  - LR @ 75 ml/hr - oncology consulted .    Thank-You, Selin Lewis RN, BSN, CCDS  Options provided:  -- SIRS of non-infectious origin due to dehydration, diarrhea, Peritoneal   carcinomatosis w/ chemo associated with (acute organ dysfunction) Lactic   acidosis, hypotension and HUMBERTO.  -- Other - I will add my own diagnosis  -- Disagree - Not applicable / Not valid  -- Disagree - Clinically unable to determine / Unknown  -- Refer to Clinical Documentation Reviewer    PROVIDER RESPONSE TEXT:    This patient has SIRS of non-infectious origin due to dehydration, diarrhea,   Peritoneal carcinomatosis w/ chemo associated with (acute organ dysfunction)   Lactic acidosis, hypotension and HUMBERTO.    Query created by: Chanda Lewis on  2/27/2025 10:44 AM      Electronically signed by:  Irving Kaye MD 2/27/2025 1:56 PM

## 2025-02-27 NOTE — FLOWSHEET NOTE
02/27/25 0002   Vital Signs   Temp 97.7 °F (36.5 °C)   Temp Source Oral   Pulse 80   Heart Rate Source Monitor   Respirations 16   /70   MAP (Calculated) 85   BP Location Right upper arm   BP Method Automatic   Patient Position Semi fowlers   Pain Assessment   Pain Assessment None - Denies Pain   Oxygen Therapy   SpO2 92 %   O2 Device None (Room air)     Reassessment completed, No change to previous assessment

## 2025-02-28 VITALS
BODY MASS INDEX: 29.72 KG/M2 | HEART RATE: 76 BPM | WEIGHT: 161.5 LBS | RESPIRATION RATE: 18 BRPM | HEIGHT: 62 IN | TEMPERATURE: 96.7 F | DIASTOLIC BLOOD PRESSURE: 89 MMHG | SYSTOLIC BLOOD PRESSURE: 159 MMHG | OXYGEN SATURATION: 92 %

## 2025-02-28 LAB
ANTI-XA UNFRAC HEPARIN: 0.64 IU/ML (ref 0.3–0.7)
BACTERIA BLD CULT ORG #2: NORMAL
BACTERIA BLD CULT: NORMAL
GLUCOSE BLD-MCNC: 101 MG/DL (ref 70–99)
GLUCOSE BLD-MCNC: 106 MG/DL (ref 70–99)
GLUCOSE BLD-MCNC: 110 MG/DL (ref 70–99)
GLUCOSE BLD-MCNC: 48 MG/DL (ref 70–99)
GLUCOSE BLD-MCNC: 95 MG/DL (ref 70–99)
PERFORMED ON: ABNORMAL
PERFORMED ON: NORMAL

## 2025-02-28 PROCEDURE — 85520 HEPARIN ASSAY: CPT

## 2025-02-28 PROCEDURE — 6370000000 HC RX 637 (ALT 250 FOR IP)

## 2025-02-28 PROCEDURE — 97162 PT EVAL MOD COMPLEX 30 MIN: CPT

## 2025-02-28 PROCEDURE — 2580000003 HC RX 258: Performed by: PEDIATRICS

## 2025-02-28 PROCEDURE — 36415 COLL VENOUS BLD VENIPUNCTURE: CPT

## 2025-02-28 PROCEDURE — 6360000002 HC RX W HCPCS: Performed by: PEDIATRICS

## 2025-02-28 PROCEDURE — 97535 SELF CARE MNGMENT TRAINING: CPT

## 2025-02-28 PROCEDURE — 97530 THERAPEUTIC ACTIVITIES: CPT

## 2025-02-28 PROCEDURE — 6370000000 HC RX 637 (ALT 250 FOR IP): Performed by: INTERNAL MEDICINE

## 2025-02-28 PROCEDURE — 2500000003 HC RX 250 WO HCPCS

## 2025-02-28 PROCEDURE — 97166 OT EVAL MOD COMPLEX 45 MIN: CPT

## 2025-02-28 PROCEDURE — 6370000000 HC RX 637 (ALT 250 FOR IP): Performed by: PEDIATRICS

## 2025-02-28 RX ORDER — CEFUROXIME AXETIL 500 MG/1
500 TABLET ORAL 2 TIMES DAILY
DISCHARGE
Start: 2025-02-28 | End: 2025-03-04

## 2025-02-28 RX ORDER — DIPHENOXYLATE HYDROCHLORIDE AND ATROPINE SULFATE 2.5; .025 MG/1; MG/1
1 TABLET ORAL 4 TIMES DAILY PRN
Status: SHIPPED | OUTPATIENT
Start: 2025-02-28 | End: 2025-03-10

## 2025-02-28 RX ORDER — OXYCODONE HYDROCHLORIDE 5 MG/1
5 TABLET ORAL EVERY 4 HOURS PRN
Qty: 20 TABLET | Refills: 0 | Status: SHIPPED | OUTPATIENT
Start: 2025-02-28 | End: 2025-03-05

## 2025-02-28 RX ORDER — LIDOCAINE 4 G/G
1 PATCH TOPICAL DAILY
DISCHARGE
Start: 2025-03-01

## 2025-02-28 RX ORDER — NIFEDIPINE 30 MG/1
30 TABLET, EXTENDED RELEASE ORAL DAILY
DISCHARGE
Start: 2025-03-01

## 2025-02-28 RX ORDER — PANTOPRAZOLE SODIUM 40 MG/1
40 TABLET, DELAYED RELEASE ORAL EVERY 12 HOURS
DISCHARGE
Start: 2025-02-28

## 2025-02-28 RX ORDER — ONDANSETRON 4 MG/1
4 TABLET, ORALLY DISINTEGRATING ORAL EVERY 8 HOURS PRN
DISCHARGE
Start: 2025-02-28

## 2025-02-28 RX ADMIN — SODIUM CHLORIDE, PRESERVATIVE FREE 10 ML: 5 INJECTION INTRAVENOUS at 12:36

## 2025-02-28 RX ADMIN — PANTOPRAZOLE SODIUM 40 MG: 40 TABLET, DELAYED RELEASE ORAL at 05:54

## 2025-02-28 RX ADMIN — CEFTRIAXONE SODIUM 2000 MG: 2 INJECTION, POWDER, FOR SOLUTION INTRAMUSCULAR; INTRAVENOUS at 08:19

## 2025-02-28 RX ADMIN — PANTOPRAZOLE SODIUM 40 MG: 40 TABLET, DELAYED RELEASE ORAL at 17:49

## 2025-02-28 RX ADMIN — APIXABAN 10 MG: 5 TABLET, FILM COATED ORAL at 12:28

## 2025-02-28 RX ADMIN — NIFEDIPINE 30 MG: 30 TABLET, FILM COATED, EXTENDED RELEASE ORAL at 08:19

## 2025-02-28 RX ADMIN — OXYCODONE 5 MG: 5 TABLET ORAL at 19:47

## 2025-02-28 ASSESSMENT — PAIN DESCRIPTION - LOCATION: LOCATION: LEG

## 2025-02-28 ASSESSMENT — PAIN SCALES - GENERAL: PAINLEVEL_OUTOF10: 8

## 2025-02-28 NOTE — FLOWSHEET NOTE
02/28/25 0713   Vital Signs   Pulse 76   Heart Rate Source Monitor   Respirations 16   BP (!) 176/88   MAP (Calculated) 117   BP Location Right upper arm   BP Method Automatic   Patient Position Semi fowlers   Oxygen Therapy   SpO2 94 %   O2 Device None (Room air)     Am assessment complete,plan of care discussed at bedside.Pt denies pain or discomfort at this time

## 2025-02-28 NOTE — PLAN OF CARE
Problem: Pain  Goal: Verbalizes/displays adequate comfort level or baseline comfort level  2/27/2025 2216 by Ashlyn Leon RN  Outcome: Progressing  2/27/2025 1527 by Michael Renae RN  Outcome: Progressing     Problem: Discharge Planning  Goal: Discharge to home or other facility with appropriate resources  2/27/2025 2216 by Ashlyn Leon RN  Outcome: Progressing  2/27/2025 1527 by Michael Renae RN  Outcome: Progressing     Problem: Safety - Adult  Goal: Free from fall injury  2/27/2025 2216 by Ashlyn Leon RN  Outcome: Progressing  2/27/2025 1527 by Michael Renae RN  Outcome: Progressing     Problem: Nutrition Deficit:  Goal: Optimize nutritional status  Outcome: Progressing

## 2025-02-28 NOTE — PROGRESS NOTES
ONCOLOGY HEMATOLOGY CARE PROGRESS NOTE      SUBJECTIVE:    Remains admitted. Afebrile. Now on heparin for DVT. Had EGD earlier this week.     ROS:     Constitutional:  No weight loss, No fever, No chills, No night sweats.  Energy level good.  Eyes:  No impairment or change in vision  ENT / Mouth:  No pain, abnormal ulceration, bleeding, nasal drip or change in voice or hearing  Cardiovascular:  No chest pain, palpitations, new edema, or calf discomfort  Respiratory:  No pain, hemoptysis, change to breathing  Breast:  No pain, discharge, change in appearance or texture  Gastrointestinal:  No pain, cramping, jaundice, change to eating and bowel habits  Urinary:  No pain, bleeding or change in continence  Genitalia: No pain, bleeding or discharge  Musculoskeletal:  No redness, pain, edema or weakness  Skin:  No pruritus, rash, change to nodules or lesions  Neurologic:  No discomfort, change in mental status, speech, sensory or motor activity  Psychiatric:  No change in concentration or change to affect or mood  Endocrine:  No hot flashes, increased thirst, or change to urine production  Hematologic: No petechiae, ecchymosis or bleeding  Lymphatic:  No lymphadenopathy or lymphedema  Allergy / Immunologic:  No eczema, hives, frequent or recurrent infections    OBJECTIVE        Physical    VITALS:  Patient Vitals for the past 24 hrs:   BP Temp Temp src Pulse Resp SpO2 Height   02/28/25 0713 (!) 176/88 -- -- 76 16 94 % --   02/28/25 0025 (!) 141/71 99.1 °F (37.3 °C) Oral 77 16 91 % --   02/27/25 1945 (!) 158/82 98.1 °F (36.7 °C) Oral 79 16 94 % --   02/27/25 1556 (!) 140/79 97.9 °F (36.6 °C) Oral 79 16 93 % --   02/27/25 1331 -- -- -- -- -- -- 1.575 m (5' 2.01\")   02/27/25 1124 (!) 144/89 97.7 °F (36.5 °C) Oral 79 16 96 % --       24HR INTAKE/OUTPUT:    Intake/Output Summary (Last 24 hours) at 2/28/2025 0804  Last data filed at 2/27/2025 1858  Gross per 24 hour   Intake 600 ml   Output  - PUD present     4. LE edema     - Doppler with DVT  - on heparin  - can switch to eliquis at discharge          ONCOLOGIC DISPOSITION: per IM      Jos eC Valiente MD  Please contact through [employer]

## 2025-02-28 NOTE — PROGRESS NOTES
Completed Specimen: Stool Updated: 02/27/25 1218     C.diff Toxin/Antigen --     Negative for Clostridium difficile antigen and toxin  Normal Range: Negative      Narrative:      ORDER#: [id number]                          ORDERED BY: LENNOX FELDMAN  SOURCE: Stool                              COLLECTED:  02/27/25 04:17  ANTIBIOTICS AT GENIA.:                      RECEIVED :  02/27/25 11:22  Collect White vial (sterile container)    Culture, Blood 2 [[id number]] Collected: 02/24/25 1524    Order Status: Completed Specimen: Blood Updated: 02/25/25 1815     Culture, Blood 2 No Growth to date.  Any change in status will be called.    Narrative:      ORDER#: [id number]                          ORDERED BY: SILVIO VELAZQUEZ  SOURCE: Blood Antecubital-Rig              COLLECTED:  02/24/25 15:24  ANTIBIOTICS AT GENIA.:                      RECEIVED :  02/24/25 15:30  If child <=2 yrs old please draw pediatric bottle.~Blood Culture #2    Culture, Blood 1 [[id number]] Collected: 02/24/25 1521    Order Status: Completed Specimen: Blood Updated: 02/25/25 1815     Blood Culture, Routine No Growth to date.  Any change in status will be called.    Narrative:      ORDER#: [id number]                          ORDERED BY: SILVIO VELAZQUEZ  SOURCE: Blood Hand, Left                   COLLECTED:  02/24/25 15:21  ANTIBIOTICS AT GENIA.:                      RECEIVED :  02/24/25 17:51  If child <=2 yrs old please draw pediatric bottle.~Blood Culture 1    Clostridium difficile toxin/antigen [[id number]] Collected: 02/24/25 1509    Order Status: Completed Specimen: Stool Updated: 02/24/25 1622     C.diff Toxin/Antigen --     Negative for Clostridium difficile antigen and toxin  Normal Range: Negative      Narrative:      ORDER#: [id number]                          ORDERED BY: SILVIO VELAZQUEZ  SOURCE: Stool                              COLLECTED:  02/24/25 15:09  ANTIBIOTICS AT GENIA.:                      RECEIVED :  02/24/25 15:11  Collect White

## 2025-02-28 NOTE — CARE COORDINATION
DISCHARGE ORDER  Date/Time 2025 3:08 PM  Completed by: Mariaa Funes RN, Case Management    Patient Name: Anita Zamora    : 1945      Admit order Date and Status:ip   Noted discharge order. (verify MD's last order for status of admission/Traditional Medicare 3 [place] Inpatient qualifying stay required for SNF)    Confirmed discharge plan with:              Patient:  Yes              When pt confirms DC plan does any support person need to be contacted by CM Yes if yes who___family is in room___                      Discharge to Facility: [facility]   Facility phone number for staff giving report: [phone]   Pre-certification completed: yes   Hospital Exemption Notification (HENS) completed: yes   Discharge orders and Continuity of Care faxed to facility:  [facility]      Transportation:               Medical Transport explained with choice list offered to pt/family.                Choice:(no preference)  Agency used: [facility]   time:   1855      Pt/family/Nursing/Facility aware of  time:   Yes Names: anita mariaa, jo, jeremy  Ambulance form completed:  yes:      Date Last IMM Given:     Comments:met with pt and family. They are all now in agreement for pt to DC to [facility]. They may want LTC at some point but closer to home. Transport arranged. Facility aware. No additional needs identified    Pt is being d/c'd to [facility] today. Pt's O2 sats are 94% on ra.    Discharge timeout done with rn, cm, pt. All discharge needs and concerns addressed.    Discharging nurse to complete CHANDA, reconcile AVS, and place final copy with patient's discharge packet. Discharging RN to ensure that written prescriptions for  Level II medications are sent with patient to the facility as per protocol.

## 2025-02-28 NOTE — CARE COORDINATION
Spoke to dtr and pt at bedside. They are agreeable to go to [facility]. Spoke to Yazmin who states pt has been accepted and precert approved. Awaiting ins determination

## 2025-02-28 NOTE — PROGRESS NOTES
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  Today, she is alert. She reports walking a little with therapy. She denies diarrhea. She reports eating more breakfast today.     Exam:   Vitals:    02/28/25 0713   BP: (!) 176/88   Pulse: 76   Resp: 16   Temp:    SpO2: 94%   Generalized: alert, no acute distress  HEENT: sclera clear, anicteric  Neck: supple, trachea midline  Heart: NSR  Abdomen: soft, NT, ND  Extremities: LLE > RLE    Medications: Reviewed    Assessment  80 yo female with pmx of peritoneal carcinomatosis on chemotherapy and HTN admitted with weakness and dysphagia. EGD with several superficial esophageal ulcers and superficial gastric ulcer. Infectious stool tests negative.      Plan  Continue supportive care  Await pathology   PPI PO BID  Regular diet  Ensure supplements TID  PT/OT  Oncology following--remains undecided, plans to follow up outpatient  DC planning to SNF  Will sign off, please call with questions     FAY Parra - CNP  9:07 AM 2/28/2025

## 2025-02-28 NOTE — PROGRESS NOTES
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 [I83.892]  Leukopenia, unspecified type [D72.819]  Adverse effect of chemotherapy, initial encounter [T45.1X5A]  Admit Date:  2/24/2025  Precautions/Restrictions/WB Status/ Lines/ Wounds/ Oxygen: Fall risk, Bed/chair alarm, Lines (IV and external catheter), Telemetry, and Isolation Precautions: Neutropenic; DVT left leg on heparin      Pt seen for cotreatment this date due to patient safety, acute illness/injury, and limited functional status information    Treatment Time:  820-031  Treatment Number:  1   Timed Code Treatment Minutes: 35 minutes  Total Treatment Minutes:  45  minutes    Patient Stated Goals for Therapy: None stated          Discharge Recommendations: SNF  DME needs for discharge: Defer to facility       Therapy recommendation for EMS Transport: requires transport by cot due to pt needs lift equipment for safe transfers, pt needs A x 2 for safe transfers, and pt with poor sitting balance/tolerance    Therapy recommendations for staff:   Assist of 2 for transfers with use of ROE STEDY and gait belt to/from BSC  to/from chair    History of Present Illness:   Per Dr. Rocha H&P on 2/24/25:    \"79 y.o. female who presents with by ambulance to [facility] as she reports she couldn't walk.    She has a hx of ovarian cancer back in 2014 which she survived with radiation therapy and chemotherapy.    Subsequently found to have peritoneal carcinomatosis -   - managed by [facility] with Dr Valiente - getting chemotherapy - 21 day cycles, cycle 1 day 1 was on 1/31/25 (6 cycles planned), receiving paclitaxel and carboplatin and not well tolerated so dose reduced 50% on subsequent cycle (see oncology note 2/21/25)  - received cycle 2 chemo 2/21/25     secondary to treatment focus on functional mobility    Positioning Needs   Pt in bed  Call light provided and all needs within reach  RN aware of pt position/status    Other Activities  Refer to OT note; don socks, brush hair    Patient/Family Education   Pt educated on role of inpatient PT, POC, importance of continued activity, DC recommendations, functional transfer/mobility safety, transfer techniques, and calling for assist with mobility.    Assessment  Pt seen today for physical therapy Evaluation & Treatment. Pt demonstrated decreased Activity tolerance, Balance, Safety, and Strength as well as decreased independence with Ambulation, Bed Mobility , and Transfers.   Pt is performing well below baseline function. Pt unable to weight shift in standing or lift foot off of the ground while standing. Pt has stairs at home which she is currently unable to complete and is unable to ambulate. Pt's daughter staying with pt currently however she lives out of state and can't stay much longer. Pt will require further PT to address the noted deficits in order to safely return to PLOF abilities.  Recommending SNF upon discharge as patient functioning below baseline, demonstrates good rehab potential and unable to return home due to limited or no family support, inability to negotiate stairs to enter home/bedroom/bathroom, burden of care beyond caregiver ability, home environment not conducive to patient recovery, and limited safety awareness.    Goals :   To be met in 3 visits:  1). Independent with LE Ex x 10 reps  2). Sit to/from stand: Min A   3). Bed to chair: Min A       To be met in 6 visits:  1).  Supine to/from sit: CGA  2).  Sit to/from stand: CGA  3).  Bed to chair: CGA  4).  Gait: Ambulate  25 ft.   with CGA and use of LRAD (least restrictive assistive device)  5).  Tolerate B LE exercises 3 sets of 10-15 reps    Rehabilitation Potential: Good  Strengths for achieving goals include:   PLOF, Family Support, and Pt

## 2025-02-28 NOTE — PROGRESS NOTES
Pharmacy - RE:  High-dose Heparin drip  Current rate = 20 units/kg/hr  Anti-Xa drawn @ 0611 = 0.64 IU/ml  Goal Anti-Xa = 0.3 - 0.7 IU/ml  Per protocol, continue current rate and obtain another Anti-Xa 3/1 @ 0600.    Yesenia Parry PharmD, formerly [facility], 2/28/2025 6:31 AM

## 2025-02-28 NOTE — DISCHARGE INSTR - COC
Continuity of Care Form    Patient Name: Anita Zamora   :  1945  MRN:  [id number]    Admit date:  2025  Discharge date:  25    Code Status Order: Limited   Advance Directives:   Advance Care Flowsheet Documentation        Date/Time Healthcare Directive Type of Healthcare Directive Copy in Chart Healthcare Agent Appointed Healthcare Agent's Name Healthcare Agent's Phone Number    25 1005 Yes, patient has an advance directive for healthcare treatment  Health care treatment directive  No, copy requested from family  Spouse  Nayeli  [phone]                     Admitting Physician:  Irving Kaye MD  PCP: Malinda Quiros APRN - CNP    Discharging Nurse: [phone]  Discharging Hospital Unit/Room#: /0323-01  Discharging Unit Phone Number: [phone]    Emergency Contact:   Extended Emergency Contact Information  Primary Emergency Contact: GomezNayeli  Mobile Phone: [phone]  Relation: Child  Preferred language: English   needed? No  Secondary Emergency Contact: Rafiq Reich  Mobile Phone: [phone]  Relation: Child  Preferred language: English   needed? No    Past Surgical History:  Past Surgical History:   Procedure Laterality Date    CT BIOPSY LYMPH NODES SUPERFICIAL  2025    CT BIOPSY LYMPH NODES SUPERFICIAL [place] [facility] [place]    UPPER GASTROINTESTINAL ENDOSCOPY N/A 2025    ESOPHAGOGASTRODUODENOSCOPY BIOPSY performed by Abner Gustafson MD [place] [facility]       Immunization History:     There is no immunization history on file for this patient.    Active Problems:  Patient Active Problem List   Diagnosis Code    Primary osteoarthritis of right knee M17.11    Leukopenia D72.819    Dehydration E86.0    General weakness R53.1    Leg edema, left R60.0    HUMBERTO (acute kidney injury) N17.9    Dysphagia R13.10    Diarrhea R19.7    Hypokalemia E87.6    Hypomagnesemia E83.42    Malignant neoplasm of peritoneum,

## 2025-02-28 NOTE — PLAN OF CARE
Problem: Pain  Goal: Verbalizes/displays adequate comfort level or baseline comfort level  2/28/2025 0904 by Angela Ambrosio RN  Outcome: Progressing  2/27/2025 2216 by Ashlyn Leon RN  Outcome: Progressing     Problem: Discharge Planning  Goal: Discharge to home or other facility with appropriate resources  2/28/2025 0904 by Angela Ambrosio RN  Outcome: Progressing  2/27/2025 2216 by Ashlyn Leon RN  Outcome: Progressing     Problem: Safety - Adult  Goal: Free from fall injury  2/28/2025 0904 by Angela Ambrosio RN  Outcome: Progressing  2/27/2025 2216 by Ashlyn Leon RN  Outcome: Progressing     Problem: Nutrition Deficit:  Goal: Optimize nutritional status  2/28/2025 0904 by Angela Ambrosio RN  Outcome: Progressing  2/27/2025 2216 by Ashlyn Leon RN  Outcome: Progressing

## 2025-02-28 NOTE — FLOWSHEET NOTE
02/27/25 1945   Vital Signs   Temp 98.1 °F (36.7 °C)   Temp Source Oral   Pulse 79   Heart Rate Source Monitor   Respirations 16   BP (!) 158/82   MAP (Calculated) 107   BP Location Right upper arm   BP Method Automatic   Patient Position Semi fowlers   Oxygen Therapy   SpO2 94 %   O2 Device None (Room air)     PM Assessment completed. Scheduled medications given per MAR, On Room Air, A&O X4, Vital Signs completed and Charted, Patient denies any further needs at this time. Call light within reach, Reminded patient to call RN if she needs anything.

## 2025-02-28 NOTE — PROGRESS NOTES
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 [I83.892]  Leukopenia, unspecified type [D72.819]  Adverse effect of chemotherapy, initial encounter [T45.1X5A]  Admit Date:  2/24/2025  Precautions/Restrictions/WB Status/ Lines/ Wounds/ Oxygen: Fall risk, Bed/chair alarm, Lines (IV and external catheter), Telemetry, and Isolation Precautions: Contact and Neutropenic    Pt seen for cotreatment this date due to patient safety, patient endurance, limited functional status information, and need for the assistance of 2 skilled therapists    Treatment Time:  2-913  Treatment Number:  1  Timed Code Treatment Minutes: 35 minutes  Total Treatment Minutes:  45  minutes    Patient Goals for Therapy: none stated          Discharge Recommendations: SNF  DME needs for discharge: Defer to facility       Therapy recommendations for staff:   Assist of 2 for transfers with use of ROE STEDY and gait belt to/from BSC  to/from chair    History of Present Illness: Per Dr. Rocha H&P 2/24/25:  79 y.o. female \"who presents with by ambulance to [facility] [place] [place] as she reports she couldn't walk.      She has a hx of ovarian cancer back in 2014 which she survived with radiation therapy and chemotherapy.      Subsequently found to have peritoneal carcinomatosis -   - managed by [facility] with Dr Valiente - getting chemotherapy - 21 day cycles, cycle 1 day 1 was on 1/31/25 (6 cycles planned), receiving paclitaxel and carboplatin and not well tolerated so dose reduced 50% on subsequent cycle (see oncology note 2/21/25)  - received cycle 2 chemo 2/21/25      She presents to [facility] [place] [place] with report of leg weakness, that she feels too weak to walk, and ongoing diarrhea. She is a confusing historian - as at one point mentioned diarrhea 7-8x  date.  Pt demonstrated decreased Activity tolerance, ADLs, Balance , Bathing, Bed mobility, Dressing, Strength, and Transfers. Pt's decreased tolerance of movement of LLE and standing due to pain, as well as inability to advance feet for transfers while standing, impacts her ability to complete ADLs, at both seated and standing levels.  She was able to tolerate standing less than 30 sec, although did tolerate 2 trials to RW.  Pt functioning below baseline and will likely benefit from skilled occupational therapy services to maximize safety and independence.     Recommending SNF upon discharge as patient functioning below baseline, demonstrates good rehab potential and unable to return home due to inability to negotiate stairs to enter home/bedroom/bathroom, burden of care beyond caregiver ability, and home environment not conducive to patient recovery.    Goal(s) :   To be met in 3 Visits:  Bed to toilet/BSC:       Mod A        To be met in 5 Visits:  Supine to/from Sit in preparation for ADL task:   Min A   Toileting        Min A   Upper Body Dressing:      Independent  Lower Body Dressing:      Min A   Pt to demonstrate UE therapeutic exs x 15 reps with minimal cues    Rehabilitation Potential: Good  Strengths for achieving goals include: PLOF, Family Support, and Pt cooperative   Barriers to achieving goals include:  Complexity of condition and Weakness    Plan:  To be seen 3-5 x/ week while in acute care setting for therapeutic exercises/activities, bed mobility training, functional transfer training, family/patient education, ADL/IADL retraining, and balance training.    Electronically signed by Lilly Mcmillan OT on 2/28/2025 at 9:32 AM      If patient discharges from this facility prior to next visit, this note will serve as the Discharge Summary     negative...

## 2025-03-01 LAB — ELASTASE PANC STL-MCNT: 203 UG/G

## 2025-03-01 NOTE — PROGRESS NOTES
Pt without IV. Telemetry monitor removed and returned to [facility]. Pt left facility in stable condition to Rehab with all of their personal belongings. Transport signed copy of narcotic script to confirm they have original to take with them. Signed copy placed in chart.

## (undated) DEVICE — CONMED SCOPE SAVER BITE BLOCK, 20X27 MM: Brand: SCOPE SAVER

## (undated) DEVICE — ENDOSCOPIC KIT 2 12 FT OP4 DE2 GWN SYR

## (undated) DEVICE — YANKAUER,BULB TIP,W/O VENT,RIGID,STERILE: Brand: MEDLINE

## (undated) DEVICE — CANNULA,OXY,ADULT,SUPERSOFT,W/7'TUB,SC: Brand: MEDLINE INDUSTRIES, INC.

## (undated) DEVICE — FORCEP BX STD CAP 240CM RAD JAW 4